# Patient Record
Sex: FEMALE | Race: WHITE | NOT HISPANIC OR LATINO | Employment: FULL TIME | URBAN - METROPOLITAN AREA
[De-identification: names, ages, dates, MRNs, and addresses within clinical notes are randomized per-mention and may not be internally consistent; named-entity substitution may affect disease eponyms.]

---

## 2017-01-06 ENCOUNTER — OFFICE VISIT (OUTPATIENT)
Dept: PSYCHIATRY | Facility: CLINIC | Age: 47
End: 2017-01-06
Payer: COMMERCIAL

## 2017-01-06 DIAGNOSIS — F43.23 ADJUSTMENT DISORDER WITH MIXED ANXIETY AND DEPRESSED MOOD: Primary | ICD-10-CM

## 2017-01-06 PROCEDURE — 90834 PSYTX W PT 45 MINUTES: CPT | Mod: S$GLB,,, | Performed by: PSYCHOLOGIST

## 2017-01-06 NOTE — PROGRESS NOTES
Individual Pyschotherapy (PhD/LCSW)    1/6/2017    Time:    Site:  [facility]           Therapeutic Intervention: Outpatient - Insight oriented psychotherapy 45-50 min - CPT code 88403    Chief complaint/reason for encounter:  Adjustment d/o     Interval history and content of current session:  Pt c/o of feeling overwhelmed again by multiple stressors including (children, ex-spouse, work, etc). We discussed the way she puts pressure on herself to take responsibility for solving everyone's problems and creates unreasonable expectations that the problems can be solved once-and-for-all. Worked with her to re-frame her goals in a more realistic paradigm that recognizes the limits of her power to control outcomes, focuses on coping with challenges in the present, and remains committed to doing the best she can do within her limits. Noted that she resonated to this idea. Noted how her assume responsibility for things she can't control relates back to her fx of origin experiences.      Treatment plan:  Target symptoms: Depressions, anxiety, irritability, self-esteem    Risk parameters:  Patient reports no suicidal ideation  Patient reports no homicidal ideation  Patient reports no self-injurious behavior  Patient reports no violent behavior    Verbal deficits: None    Patient's response to intervention:  The patient's response to intervention is accepting, motivated.    Progress toward goals and other mental status changes:  The patient's progress toward goals is fair    Diagnosis:309.28    Plan:  continue psychotherapy     Return to clinic: as scheduled.

## 2017-01-30 ENCOUNTER — OFFICE VISIT (OUTPATIENT)
Dept: PSYCHIATRY | Facility: CLINIC | Age: 47
End: 2017-01-30
Payer: COMMERCIAL

## 2017-01-30 DIAGNOSIS — F43.23 ADJUSTMENT DISORDER WITH MIXED ANXIETY AND DEPRESSED MOOD: Primary | ICD-10-CM

## 2017-01-30 PROCEDURE — 90834 PSYTX W PT 45 MINUTES: CPT | Mod: S$GLB,,, | Performed by: PSYCHOLOGIST

## 2017-01-30 NOTE — PROGRESS NOTES
"Individual Pyschotherapy (PhD/LCSW)    1/30/2017    Time:    Site:  [facility]           Therapeutic Intervention: Outpatient - Insight oriented psychotherapy 45-50 min - CPT code 36837    Chief complaint/reason for encounter:  Adjustment d/o     Interval history and content of current session:  Pt reports she is feeling a little better. She and her significant other, Raoul, have been enjoying quality time in spite of the stressful demands of childcare and work. However, work remains a source of considerable stress especially in connection with her co-worker who she experiences as competitive, manipulative, and sneaky. Pt's reaction to her co-worker's behavior has reached the point where it is affecting both her productivity at work and her ability to function effectively at home. We discussed the issues evoked by her co-worker and pt was able to relate it to her own perfectionism and the areas of work where the pt feels less adequate (mainly paperwork). Pt agreed she needs to shift her goals from being outstanding in all areas to simply "doing her best". She also was able to come up with the idea of redirecting thoughts about her co-worker towards aspects of her work life that she could address and improve on.        Treatment plan:  Target symptoms: Depressions, anxiety, irritability, self-esteem    Risk parameters:  Patient reports no suicidal ideation  Patient reports no homicidal ideation  Patient reports no self-injurious behavior  Patient reports no violent behavior    Verbal deficits: None    Patient's response to intervention:  The patient's response to intervention is accepting, motivated.    Progress toward goals and other mental status changes:  The patient's progress toward goals is fair    Diagnosis:309.28    Plan:  continue psychotherapy     Return to clinic: as scheduled.  "

## 2017-02-17 ENCOUNTER — OFFICE VISIT (OUTPATIENT)
Dept: PSYCHIATRY | Facility: CLINIC | Age: 47
End: 2017-02-17
Payer: COMMERCIAL

## 2017-02-17 DIAGNOSIS — F43.23 ADJUSTMENT DISORDER WITH MIXED ANXIETY AND DEPRESSED MOOD: Primary | ICD-10-CM

## 2017-02-17 PROCEDURE — 90834 PSYTX W PT 45 MINUTES: CPT | Mod: S$GLB,,, | Performed by: PSYCHOLOGIST

## 2017-02-17 NOTE — PROGRESS NOTES
"Individual Pyschotherapy (PhD/LCSW)    2/17/2017    Time:    Site:  [facility]           Therapeutic Intervention: Outpatient - Insight oriented psychotherapy 45-50 min - CPT code 79542    Chief complaint/reason for encounter:  Adjustment d/o     Interval history and content of current session:  Pt describes herself as frustrated, discouraged, and overwhelmed at present by the combined demands of work, finances, and family. At work she is facing the ongoing problem of being upstaged by her colleague who appears better at ingratiating herself with their supervisor than the pt, despite the pt's greater ability to do their job. At home she bears the total responsibility for their children's education (all at private schools at present) and for managing the behavior of the two younger ones. We discussed the way she takes on responsibility for everyone else's problems while ignoring her limits. She agreed that her "sanity" was more important than which school her children end up going to. She also agreed that she needs to talk to her former supervisor to get advice as how to deal with her current work problems. And, she has come to the conclusion that she probably needs to start a new job search.          Treatment plan:  Target symptoms: Depressions, anxiety, irritability, self-esteem    Risk parameters:  Patient reports no suicidal ideation  Patient reports no homicidal ideation  Patient reports no self-injurious behavior  Patient reports no violent behavior    Verbal deficits: None    Patient's response to intervention:  The patient's response to intervention is accepting, motivated.    Progress toward goals and other mental status changes:  The patient's progress toward goals is fair    Diagnosis:309.28    Plan:  continue psychotherapy     Return to clinic: as scheduled.  "

## 2017-04-04 ENCOUNTER — OFFICE VISIT (OUTPATIENT)
Dept: PSYCHIATRY | Facility: CLINIC | Age: 47
End: 2017-04-04
Payer: COMMERCIAL

## 2017-04-04 DIAGNOSIS — F43.23 ADJUSTMENT DISORDER WITH MIXED ANXIETY AND DEPRESSED MOOD: Primary | ICD-10-CM

## 2017-04-04 PROCEDURE — 99999 PR PBB SHADOW E&M-EST. PATIENT-LVL I: CPT | Mod: PBBFAC,,, | Performed by: PSYCHOLOGIST

## 2017-04-04 PROCEDURE — 90834 PSYTX W PT 45 MINUTES: CPT | Mod: S$GLB,,, | Performed by: PSYCHOLOGIST

## 2017-04-04 NOTE — PROGRESS NOTES
"Individual Pyschotherapy (PhD/LCSW)    4/4/2017    Time:    Site:  [facility]           Therapeutic Intervention: Outpatient - Insight oriented psychotherapy 45-50 min - CPT code 91655    Chief complaint/reason for encounter:  Adjustment d/o     Interval history and content of current session:  Pt reported she has made significant progress in dealing with the stress of her work situation. She was able to connect with her manager about the problems with her co-worker and is now able to focus on her work without having to be constantly worried about being undermined by the co-worker. She is actively engaged in transferring what she learned from the experience to other areas of stress in her life by applying principles of "goal identification", research, and self-acceptance into her challenges.          Treatment plan:  Target symptoms: Depressions, anxiety, irritability, self-esteem    Risk parameters:  Patient reports no suicidal ideation  Patient reports no homicidal ideation  Patient reports no self-injurious behavior  Patient reports no violent behavior    Verbal deficits: None    Patient's response to intervention:  The patient's response to intervention is accepting, motivated.    Progress toward goals and other mental status changes:  The patient's progress toward goals is fair    Diagnosis:309.28    Plan:  continue psychotherapy     Return to clinic: as scheduled.  "

## 2017-06-22 ENCOUNTER — OFFICE VISIT (OUTPATIENT)
Dept: PSYCHIATRY | Facility: CLINIC | Age: 47
End: 2017-06-22
Payer: COMMERCIAL

## 2017-06-22 DIAGNOSIS — F43.23 ADJUSTMENT DISORDER WITH MIXED ANXIETY AND DEPRESSED MOOD: Primary | ICD-10-CM

## 2017-06-22 PROCEDURE — 99999 PR PBB SHADOW E&M-EST. PATIENT-LVL I: CPT | Mod: PBBFAC,,, | Performed by: PSYCHOLOGIST

## 2017-06-22 PROCEDURE — 90834 PSYTX W PT 45 MINUTES: CPT | Mod: S$GLB,,, | Performed by: PSYCHOLOGIST

## 2017-06-22 NOTE — PROGRESS NOTES
Individual Pyschotherapy (PhD/LCSW)    6/22/2017    Time:    Site:  [facility]           Therapeutic Intervention: Outpatient - Insight oriented psychotherapy 45-50 min - CPT code 64783    Chief complaint/reason for encounter:  Adjustment d/o     Interval history and content of current session:  Pt reports feeling overwhelmed by multiple demands of children, work, finances, and coping with her ex-spouse. She is particularly distressed by the ex's demands for help with his issues. Noted that she has stood her ground and responded assertively to him despite guilt for doing so. We discussed the need for pt to take care of herself and to maintain boundaries with the ex and the ex's mother. Also discussed how she might re-frame her goals in a way that is more conducive to taking one day at at time rather than making more global assessments of how she is doing. Pt notes that her relationship with her finance is going well despite the stress.     Treatment plan:  Target symptoms: Depressions, anxiety, irritability, self-esteem    Risk parameters:  Patient reports no suicidal ideation  Patient reports no homicidal ideation  Patient reports no self-injurious behavior  Patient reports no violent behavior    Verbal deficits: None    Patient's response to intervention:  The patient's response to intervention is accepting, motivated.    Progress toward goals and other mental status changes:  The patient's progress toward goals is fair    Diagnosis:309.28    Plan:  continue psychotherapy     Return to clinic: as scheduled.

## 2017-07-21 ENCOUNTER — OFFICE VISIT (OUTPATIENT)
Dept: PSYCHIATRY | Facility: CLINIC | Age: 47
End: 2017-07-21
Payer: COMMERCIAL

## 2017-07-21 DIAGNOSIS — F43.23 ADJUSTMENT DISORDER WITH MIXED ANXIETY AND DEPRESSED MOOD: Primary | ICD-10-CM

## 2017-07-21 PROCEDURE — 90834 PSYTX W PT 45 MINUTES: CPT | Mod: S$GLB,,, | Performed by: PSYCHOLOGIST

## 2017-07-21 NOTE — PROGRESS NOTES
Individual Pyschotherapy (PhD/LCSW)    7/21/2017    Time:    Site:  [facility]           Therapeutic Intervention: Outpatient - Insight oriented psychotherapy 45-50 min - CPT code 59490    Chief complaint/reason for encounter:  Adjustment d/o     Interval history and content of current session:  Pt reports in regaining a sense of control in her life. She has worked through issues with her co-worker to the point where they are functioning as a team and doing well with their productivity numbers. She has continued to be assertive with her ex-spouse and is working at depersonalizing his comments with some success. Since he has had the children for the last month due to their summer vacation, she has gotten a break from the multiple stressors of childcare, work, and related concerns. However, she is worried about how she will cope when they start school and move back with her. We discussed the importance of being aware and accepting of her limits, self-care, and trusting herself to cope. We also looked at ways she can add daily structure to her life to help her with her ADD symptoms. Pt is interested in learning some mediation techniques and we plan to address this in coming sessions.     Treatment plan:  Target symptoms: Depressions, anxiety, irritability, self-esteem    Risk parameters:  Patient reports no suicidal ideation  Patient reports no homicidal ideation  Patient reports no self-injurious behavior  Patient reports no violent behavior    Verbal deficits: None    Patient's response to intervention:  The patient's response to intervention is accepting, motivated.    Progress toward goals and other mental status changes:  The patient's progress toward goals is fair    Diagnosis:309.28    Plan:  continue psychotherapy     Return to clinic: as scheduled.

## 2017-08-17 ENCOUNTER — OFFICE VISIT (OUTPATIENT)
Dept: PSYCHIATRY | Facility: CLINIC | Age: 47
End: 2017-08-17
Payer: COMMERCIAL

## 2017-08-17 DIAGNOSIS — F43.23 ADJUSTMENT DISORDER WITH MIXED ANXIETY AND DEPRESSED MOOD: Primary | ICD-10-CM

## 2017-08-17 PROCEDURE — 90834 PSYTX W PT 45 MINUTES: CPT | Mod: S$GLB,,, | Performed by: PSYCHOLOGIST

## 2017-08-17 NOTE — PROGRESS NOTES
Individual Pyschotherapy (PhD/LCSW)    8/17/2017    Time:    Site:  [facility]           Therapeutic Intervention: Outpatient - Insight oriented psychotherapy 45-50 min - CPT code 70157    Chief complaint/reason for encounter:  Adjustment d/o     Interval history and content of current session:  Pt discussed the difficulty of managing all the stressors in her life (children, work, finances, etc) while feeling ultimately alone. She appreciates the support of her boyfriend, but she feels that his commitment to her well -being and the well-being of her children is limited. So, when it comes to making decisions, it all falls on her shoulders. Her work situation has improved. But, her finances are stretched thin and she is coping with significant difficulties re: each of her children. She has a hard time giving her self credit for what she has accomplished. We discussed her fx of origin dynamics as they relate to the expectations she has or herself can be unrealistically critical.     Treatment plan:  Target symptoms: Depressions, anxiety, irritability, self-esteem    Risk parameters:  Patient reports no suicidal ideation  Patient reports no homicidal ideation  Patient reports no self-injurious behavior  Patient reports no violent behavior    Verbal deficits: None    Patient's response to intervention:  The patient's response to intervention is accepting, motivated.    Progress toward goals and other mental status changes:  The patient's progress toward goals is fair    Diagnosis:309.28    Plan:  continue psychotherapy     Return to clinic: as scheduled.

## 2017-10-19 ENCOUNTER — OFFICE VISIT (OUTPATIENT)
Dept: PSYCHIATRY | Facility: CLINIC | Age: 47
End: 2017-10-19
Payer: COMMERCIAL

## 2017-10-19 DIAGNOSIS — F43.23 ADJUSTMENT DISORDER WITH MIXED ANXIETY AND DEPRESSED MOOD: Primary | ICD-10-CM

## 2017-10-19 PROCEDURE — 90834 PSYTX W PT 45 MINUTES: CPT | Mod: S$GLB,,, | Performed by: PSYCHOLOGIST

## 2017-10-19 NOTE — PROGRESS NOTES
Individual Pyschotherapy (PhD/LCSW)    10/19/2017    Time:    Site:  [facility]           Therapeutic Intervention: Outpatient - Insight oriented psychotherapy 45-50 min - CPT code 77368    Chief complaint/reason for encounter:  Adjustment d/o     Interval history and content of current session:  Pt reports that things are continuing to go well at work (she recently got a new position) and she is handling her frustration with her ex-spouse better. Her concern to day is focused on her 11 y.omiddle child, , and the feelings his passive aggressive behavior evokes in her. Pt is very worried about 's poor academic performance and very frustrated by her inability manage his behavior (intrusive; unresponsive to limits; manipulative). She also finds herself feeling increasingly estranged from him (ie not wanting to be around him) which, in turn, makes her feel guilty. Using the concept of projective identification, we explored various options in how to respond differently to . Pt was able to embrace the idea that the powerlessness and anger she feels may indicate what he is experiencing but can't handle; so he is acting (albeit unconsciously) to get her to feel the feelings he can't deal with. This hypothesis allowed pt to consider different responses based on her insight into her own feelings of powerlessness and frustration in her encounters with him. Pt found the suggestions helpful.     Treatment plan:  Target symptoms: Depressions, anxiety, irritability, self-esteem    Risk parameters:  Patient reports no suicidal ideation  Patient reports no homicidal ideation  Patient reports no self-injurious behavior  Patient reports no violent behavior    Verbal deficits: None    Patient's response to intervention:  The patient's response to intervention is accepting, motivated.    Progress toward goals and other mental status changes:  The patient's progress toward goals is  fair    Diagnosis:309.28    Plan:  continue psychotherapy     Return to clinic: as scheduled.

## 2018-01-10 ENCOUNTER — OFFICE VISIT (OUTPATIENT)
Dept: PSYCHIATRY | Facility: CLINIC | Age: 48
End: 2018-01-10
Payer: COMMERCIAL

## 2018-01-10 DIAGNOSIS — F43.23 ADJUSTMENT DISORDER WITH MIXED ANXIETY AND DEPRESSED MOOD: Primary | ICD-10-CM

## 2018-01-10 PROCEDURE — 90834 PSYTX W PT 45 MINUTES: CPT | Mod: S$GLB,,, | Performed by: PSYCHOLOGIST

## 2018-01-10 NOTE — PROGRESS NOTES
"Individual Pyschotherapy (PhD/LCSW)    1/10/2018    Time:    Site:  [facility]           Therapeutic Intervention: Outpatient - Insight oriented psychotherapy 45-50 min - CPT code 28757    Chief complaint/reason for encounter:  Adjustment d/o     Interval history and content of current session:  Pt focused on her problems managing the behavior of her middle son, , who is doing poorly in school and with whom she gets into daily battles around homework. Pt frustrated with herself for losing her temper and triggering escalating power struggles with him. She feels it is affecting her work and degrading the atmosphere in her home (so that her younger son is affected). She is "at her wits end" regarding how to motivate him. She has recently had him tested and the results confirm ADHD. She is looking for a therapist for him. We discussed parenting techniques that focus on reward (vs punishment). Also worked with her to develop a "daily ritual of awareness" to help her manage her feelings when she comes up against his oppositional behavior.     Treatment plan:  Target symptoms: Depressions, anxiety, irritability, self-esteem    Risk parameters:  Patient reports no suicidal ideation  Patient reports no homicidal ideation  Patient reports no self-injurious behavior  Patient reports no violent behavior    Verbal deficits: None    Patient's response to intervention:  The patient's response to intervention is accepting, motivated.    Progress toward goals and other mental status changes:  The patient's progress toward goals is fair    Diagnosis:309.28    Plan:  continue psychotherapy     Return to clinic: as scheduled.  "

## 2018-01-25 ENCOUNTER — OFFICE VISIT (OUTPATIENT)
Dept: URGENT CARE | Facility: CLINIC | Age: 48
End: 2018-01-25
Payer: COMMERCIAL

## 2018-01-25 VITALS
SYSTOLIC BLOOD PRESSURE: 156 MMHG | RESPIRATION RATE: 18 BRPM | BODY MASS INDEX: 26.5 KG/M2 | HEART RATE: 99 BPM | TEMPERATURE: 100 F | OXYGEN SATURATION: 100 % | DIASTOLIC BLOOD PRESSURE: 84 MMHG | HEIGHT: 60 IN | WEIGHT: 135 LBS

## 2018-01-25 DIAGNOSIS — J10.1 INFLUENZA A: Primary | ICD-10-CM

## 2018-01-25 LAB
CTP QC/QA: YES
FLUAV AG NPH QL: POSITIVE
FLUBV AG NPH QL: NEGATIVE

## 2018-01-25 PROCEDURE — 87804 INFLUENZA ASSAY W/OPTIC: CPT | Mod: QW,S$GLB,, | Performed by: NURSE PRACTITIONER

## 2018-01-25 PROCEDURE — 99213 OFFICE O/P EST LOW 20 MIN: CPT | Mod: S$GLB,,, | Performed by: NURSE PRACTITIONER

## 2018-01-25 RX ORDER — BENZONATATE 100 MG/1
200 CAPSULE ORAL 3 TIMES DAILY PRN
Qty: 30 CAPSULE | Refills: 0 | Status: SHIPPED | OUTPATIENT
Start: 2018-01-25 | End: 2018-02-04

## 2018-01-25 RX ORDER — LISDEXAMFETAMINE DIMESYLATE 30 MG/1
30 CAPSULE ORAL EVERY MORNING
COMMUNITY

## 2018-01-25 RX ORDER — OSELTAMIVIR PHOSPHATE 75 MG/1
75 CAPSULE ORAL 2 TIMES DAILY
Qty: 10 CAPSULE | Refills: 0 | Status: SHIPPED | OUTPATIENT
Start: 2018-01-25 | End: 2018-01-30

## 2018-01-25 NOTE — PATIENT INSTRUCTIONS
Please follow up with your primary care provider if you are not feeling better in 7-10 days.    Please drink plenty of fluids.  Please get plenty of rest.    Please return here or go to the Emergency Department for any concerns or worsening of condition.    If you were prescribed antibiotics, please take them to completion.    If you do have Hypertension or palpitations, it is safe to take Coricidin HBP or Mucinex DM for relief of congestion and cough. Take as directed on bottle with at least 2 glasses of water.    If not allergic, please take over the counter Tylenol (Acetaminophen) and/or Motrin (Ibuprofen) as directed on bottle for control of pain and/or fever.    Please follow up with your primary care doctor or specialist as needed.    If you  smoke, please stop smoking.    Influenza (Adult)    Influenza is also called the flu. It is a viral illness that affects the air passages of your lungs. It is different from the common cold. The flu can easily be passed from one to person to another. It may be spread through the air by coughing and sneezing. Or it can be spread by touching the sick person and then touching your own eyes, nose, or mouth.  The flu starts 1 to 3 days after you are exposed to the flu virus. It may last for 1 to 2 weeks but many people feel tired or fatigued for many weeks afterward. You usually dont need to take antibiotics unless you have a complication. This might be an ear or sinus infection or pneumonia.  Symptoms of the flu may be mild or severe. They can include extreme tiredness (wanting to stay in bed all day), chills, fevers, muscle aches, soreness with eye movement, headache, and a dry, hacking cough.  Home care  Follow these guidelines when caring for yourself at home:  · Avoid being around cigarette smoke, whether yours or other peoples.  · Acetaminophen or ibuprofen will help ease your fever, muscle aches, and headache. Dont give aspirin to anyone younger than 18 who has the  flu. Aspirin can harm the liver.  · Nausea and loss of appetite are common with the flu. Eat light meals. Drink 6 to 8 glasses of liquids every day. Good choices are water, sport drinks, soft drinks without caffeine, juices, tea, and soup. Extra fluids will also help loosen secretions in your nose and lungs.  · Over-the-counter cold medicines will not make the flu go away faster. But the medicines may help with coughing, sore throat, and congestion in your nose and sinuses. Dont use a decongestant if you have high blood pressure.  · Stay home until your fever has been gone for at least 24 hours without using medicine to reduce fever.  Follow-up care  Follow up with your healthcare provider, or as advised, if you are not getting better over the next week.  If you are age 65 or older, talk with your provider about getting a pneumococcal vaccine every 5 years. You should also get this vaccine if you have chronic asthma or COPD. All adults should get a flu vaccine every fall. Ask your provider about this.  When to seek medical advice  Call your healthcare provider right away if any of these occur:  · Cough with lots of colored mucus (sputum) or blood in your mucus  · Chest pain, shortness of breath, wheezing, or trouble breathing  · Severe headache, or face, neck, or ear pain  · New rash with fever  · Fever of 100.4°F (38°C) or higher, or as directed by your healthcare provider  · Confusion, behavior change, or seizure  · Severe weakness or dizziness  · You get a new fever or cough after getting better for a few days  Date Last Reviewed: 1/1/2017  © 7208-5840 [employer]. [address] [place] [address], [place] [address]. All rights reserved. This information is not intended as a substitute for professional medical care. Always follow your healthcare professional's instructions.

## 2018-01-25 NOTE — PROGRESS NOTES
Subjective:       Patient ID: Rebeca Birch is a 47 y.o. female.    Vitals:  height is 5' (1.524 m) and weight is 61.2 kg (135 lb). Her temperature is 99.5 °F (37.5 °C). Her blood pressure is 156/84 (abnormal) and her pulse is 99. Her respiration is 18 and oxygen saturation is 100%.     Chief Complaint: Sore Throat (Started 2 days ago )    Pt reports symptoms for 2 days.      Sore Throat    This is a new problem. The current episode started in the past 7 days. The problem has been unchanged. The pain is worse on the left side. The pain is mild. Associated symptoms include congestion, coughing, headaches and a hoarse voice. Pertinent negatives include no abdominal pain, ear pain or shortness of breath. The treatment provided no relief.     Review of Systems   Constitution: Positive for malaise/fatigue. Negative for chills and fever.   HENT: Positive for congestion, hoarse voice and sore throat. Negative for ear pain.    Eyes: Negative for discharge and redness.   Cardiovascular: Negative for chest pain, dyspnea on exertion and leg swelling.   Respiratory: Positive for cough. Negative for shortness of breath, sputum production and wheezing.    Musculoskeletal: Positive for myalgias.   Gastrointestinal: Negative for abdominal pain and nausea.   Neurological: Positive for headaches.       Objective:      Physical Exam   Constitutional: She is oriented to person, place, and time. Vital signs are normal. She appears well-developed and well-nourished. She is cooperative.  Non-toxic appearance. She does not have a sickly appearance. She does not appear ill. No distress.   HENT:   Head: Normocephalic and atraumatic.   Right Ear: Hearing, tympanic membrane, external ear and ear canal normal.   Left Ear: Hearing, tympanic membrane, external ear and ear canal normal.   Nose: Mucosal edema and rhinorrhea present.   Mouth/Throat: Uvula is midline and mucous membranes are normal. Posterior oropharyngeal edema and posterior  oropharyngeal erythema present.   Eyes: Conjunctivae and lids are normal.   Neck: Normal range of motion and full passive range of motion without pain. Neck supple. No neck rigidity. No edema, no erythema and normal range of motion present.   Cardiovascular: Normal rate, regular rhythm and normal heart sounds.    Pulmonary/Chest: Effort normal and breath sounds normal. No accessory muscle usage. No apnea, no tachypnea and no bradypnea. No respiratory distress. She has no decreased breath sounds. She has no wheezes. She has no rhonchi. She has no rales.   Positive cough   Abdominal: Normal appearance.   Lymphadenopathy:        Head (right side): No submental, no submandibular, no tonsillar, no preauricular, no posterior auricular and no occipital adenopathy present.        Head (left side): No submental, no submandibular, no tonsillar, no preauricular, no posterior auricular and no occipital adenopathy present.     She has cervical adenopathy.        Right cervical: Superficial cervical adenopathy present.        Left cervical: Superficial cervical adenopathy present.   Neurological: She is alert and oriented to person, place, and time.   Psychiatric: She has a normal mood and affect. Her speech is normal and behavior is normal.   Nursing note and vitals reviewed.      Assessment:       1. Influenza A        Plan:         Influenza A  -     POCT Influenza A/B  -     oseltamivir (TAMIFLU) 75 MG capsule; Take 1 capsule (75 mg total) by mouth 2 (two) times daily.  Dispense: 10 capsule; Refill: 0  -     benzonatate (TESSALON PERLES) 100 MG capsule; Take 2 capsules (200 mg total) by mouth 3 (three) times daily as needed.  Dispense: 30 capsule; Refill: 0      Discussed positive results of flu test with pt and symptom therapy for fevers and congestion with tylenol, ibuprofen, and mucinex as directed on bottle. Increase fluids.  Follow up with pcp for no improvement in 7-10 days.

## 2018-02-02 ENCOUNTER — OFFICE VISIT (OUTPATIENT)
Dept: PSYCHIATRY | Facility: CLINIC | Age: 48
End: 2018-02-02
Payer: COMMERCIAL

## 2018-02-02 DIAGNOSIS — F43.23 ADJUSTMENT DISORDER WITH MIXED ANXIETY AND DEPRESSED MOOD: Primary | ICD-10-CM

## 2018-02-02 PROCEDURE — 90834 PSYTX W PT 45 MINUTES: CPT | Mod: S$GLB,,, | Performed by: PSYCHOLOGIST

## 2018-02-02 NOTE — PROGRESS NOTES
"Individual Pyschotherapy (PhD/LCSW)    2/2/2018    Time:    Site:  [facility]           Therapeutic Intervention: Outpatient - Insight oriented psychotherapy 45-50 min - CPT code 48382    Chief complaint/reason for encounter:  Adjustment d/o     Interval history and content of current session:  Pt discussed feeling overwhelmed with multiple demands of work, children, finances, etc. She fears that her job situation is changing and that she may not be able to keep it through the end of the year. She is struggling to meet the multiple needs of her 3 children who she hopes to keep in private schools. She feels proud of being able to take on the responsibilities she is managing, but she also fears losing her ability to succeed in any of these endeavors. We discussed strategies she can use to reduce her anxiety to a more manageable level. For example, she can ask her signficant other for help readying he resume so that she is prepared to look for gettng a new job if necessary. She can refuse to get involved with her ex-mother-in-laws difficulties with her middle child. Also looked at her worst fears and noted that she has shown the resilience in the past to manage them should they come to fruition. Encouraged her to think about "plowing ahead" with her work doing the best she can each day rather than dwelling on the possibilities of what might happen in the future. In this regard she has started doing a daily ritual which she says is helping manage her anxiety by reciting the Serenity Prayer a the beginning of each day.     Treatment plan:  Target symptoms: Depressions, anxiety, irritability, self-esteem    Risk parameters:  Patient reports no suicidal ideation  Patient reports no homicidal ideation  Patient reports no self-injurious behavior  Patient reports no violent behavior    Verbal deficits: None    Patient's response to intervention:  The patient's response to intervention is accepting, " motivated.    Progress toward goals and other mental status changes:  The patient's progress toward goals is fair    Diagnosis:309.28    Plan:  continue psychotherapy     Return to clinic: as scheduled.

## 2018-07-16 ENCOUNTER — OFFICE VISIT (OUTPATIENT)
Dept: PSYCHIATRY | Facility: CLINIC | Age: 48
End: 2018-07-16
Payer: COMMERCIAL

## 2018-07-16 DIAGNOSIS — F43.23 ADJUSTMENT DISORDER WITH MIXED ANXIETY AND DEPRESSED MOOD: Primary | ICD-10-CM

## 2018-07-16 PROCEDURE — 90834 PSYTX W PT 45 MINUTES: CPT | Mod: S$GLB,,, | Performed by: PSYCHOLOGIST

## 2018-09-05 ENCOUNTER — OFFICE VISIT (OUTPATIENT)
Dept: PSYCHIATRY | Facility: CLINIC | Age: 48
End: 2018-09-05
Payer: COMMERCIAL

## 2018-09-05 DIAGNOSIS — F43.23 ADJUSTMENT DISORDER WITH MIXED ANXIETY AND DEPRESSED MOOD: Primary | ICD-10-CM

## 2018-09-05 PROCEDURE — 90834 PSYTX W PT 45 MINUTES: CPT | Mod: S$GLB,,, | Performed by: PSYCHOLOGIST

## 2018-09-05 NOTE — PROGRESS NOTES
"Individual Pyschotherapy (PhD/LCSW)    9/5/2018    Time:    Site:  [facility]           Therapeutic Intervention: Outpatient - Insight oriented psychotherapy 45-50 min - CPT code 56879    Chief complaint/reason for encounter:  Adjustment d/o     Interval history and content of current session:  Pt reports having a good summer both at work and with her family. However, with the children back in school and uncertainties about the future of her job (pending a layoff scheduled for Oct) pt describes herself again feeling overwhelmed with multiple demands of work, children, finances, etc.  In the process of attending to the multiple things competing for her attention, she feels like she is not doing a good enough job with any of them and she is ignoring important elements of self-care (eg exercise). She found it very helpful to consider that she is so motivated to accomplish tasks very well undermines her motivation to do anything at all (because she can't do it the way she feels she should do it). She agreed to start focusing on doing small things to take of herself and her needs rather than trying to do them all at once. She could also apply this strategy to work situations - eg. focus on starting her resume instead of sitting down and doing the whole thing in case she gets laid off). We also discussed how much she is actually accomplishing with her children and her work, despite the fact it may be falling short of "the ideal."     Treatment plan:  Target symptoms: Depressions, anxiety, irritability, self-esteem    Risk parameters:  Patient reports no suicidal ideation  Patient reports no homicidal ideation  Patient reports no self-injurious behavior  Patient reports no violent behavior    Verbal deficits: None    Patient's response to intervention:  The patient's response to intervention is accepting, motivated.    Progress toward goals and other mental status changes:  The patient's progress toward goals is " fair    Diagnosis:309.28    Plan:  continue psychotherapy     Return to clinic: as scheduled.

## 2018-09-17 ENCOUNTER — OFFICE VISIT (OUTPATIENT)
Dept: URGENT CARE | Facility: CLINIC | Age: 48
End: 2018-09-17
Payer: COMMERCIAL

## 2018-09-17 VITALS
WEIGHT: 135 LBS | BODY MASS INDEX: 27.21 KG/M2 | HEART RATE: 80 BPM | RESPIRATION RATE: 18 BRPM | TEMPERATURE: 98 F | OXYGEN SATURATION: 100 % | HEIGHT: 59 IN | DIASTOLIC BLOOD PRESSURE: 83 MMHG | SYSTOLIC BLOOD PRESSURE: 146 MMHG

## 2018-09-17 DIAGNOSIS — J02.9 SORE THROAT: Primary | ICD-10-CM

## 2018-09-17 LAB
CTP QC/QA: YES
S PYO RRNA THROAT QL PROBE: NEGATIVE

## 2018-09-17 PROCEDURE — 99214 OFFICE O/P EST MOD 30 MIN: CPT | Mod: S$GLB,,, | Performed by: NURSE PRACTITIONER

## 2018-09-17 PROCEDURE — 87880 STREP A ASSAY W/OPTIC: CPT | Mod: QW,S$GLB,, | Performed by: NURSE PRACTITIONER

## 2018-09-17 RX ORDER — DEXTROAMPHETAMINE SULFATE, DEXTROAMPHETAMINE SACCHARATE, AMPHETAMINE ASPARTATE MONOHYDRATE, AND AMPHETAMINE SULFATE 6.25; 6.25; 6.25; 6.25 MG/1; MG/1; MG/1; MG/1
CAPSULE, EXTENDED RELEASE ORAL
Refills: 0 | COMMUNITY
Start: 2018-07-29 | End: 2023-10-31

## 2018-09-17 NOTE — PROGRESS NOTES
"Subjective:       Patient ID: Rebeca Birch is a 47 y.o. female.    Vitals:  height is 4' 11" (1.499 m) and weight is 61.2 kg (135 lb). Her oral temperature is 98.4 °F (36.9 °C). Her blood pressure is 146/83 (abnormal) and her pulse is 80. Her respiration is 18 and oxygen saturation is 100%.     Chief Complaint: Sore Throat      The patient presents to the clinic today with complaints of sore throat.  Her son was seen and diagnosed with strep throat here yesterday.  Patient recently had a dental abscess for the bone graft placement in her back left crown.  She is followed closely by dentistry.  Finished amoxicillin 1 week ago.  She is also complaining of some lymph node swelling in her neck.  Denies any fever, chills, or body aches.  She has a follow up with dentistry next week.      Sore Throat    This is a new problem. The current episode started yesterday. The problem has been gradually worsening. Neither side of throat is experiencing more pain than the other. The maximum temperature recorded prior to her arrival was 100.4 - 100.9 F. The fever has been present for less than 1 day. The pain is at a severity of 5/10. The pain is moderate. Associated symptoms include congestion, a hoarse voice and swollen glands. Pertinent negatives include no abdominal pain, coughing, ear pain, headaches or shortness of breath. She has had exposure to strep. She has had no exposure to mono. She has tried nothing for the symptoms. The treatment provided no relief.     Review of Systems   Constitution: Positive for fever. Negative for chills and malaise/fatigue.   HENT: Positive for congestion, hoarse voice and sore throat. Negative for ear pain.    Eyes: Negative for discharge and redness.   Cardiovascular: Negative for chest pain, dyspnea on exertion and leg swelling.   Respiratory: Negative for cough, shortness of breath, sputum production and wheezing.    Musculoskeletal: Negative for myalgias.   Gastrointestinal: Negative " for abdominal pain and nausea.   Neurological: Negative for headaches.       Objective:      Physical Exam   Constitutional: She is oriented to person, place, and time. She appears well-developed and well-nourished. She is cooperative.  Non-toxic appearance. She does not appear ill. No distress.   HENT:   Head: Normocephalic and atraumatic.   Right Ear: Hearing, tympanic membrane, external ear and ear canal normal.   Left Ear: Hearing, tympanic membrane, external ear and ear canal normal.   Nose: Nose normal. No mucosal edema, rhinorrhea or nasal deformity. No epistaxis. Right sinus exhibits no maxillary sinus tenderness and no frontal sinus tenderness. Left sinus exhibits no maxillary sinus tenderness and no frontal sinus tenderness.   Mouth/Throat: Uvula is midline and mucous membranes are normal. No trismus in the jaw. Normal dentition. No uvula swelling. Posterior oropharyngeal erythema present. Tonsils are 1+ on the right. Tonsils are 1+ on the left. No tonsillar exudate.       Eyes: Conjunctivae and lids are normal. No scleral icterus.   Sclera clear bilat   Neck: Trachea normal, full passive range of motion without pain and phonation normal. Neck supple.   Cardiovascular: Normal rate, regular rhythm, normal heart sounds, intact distal pulses and normal pulses.   Pulmonary/Chest: Effort normal and breath sounds normal. No respiratory distress.   Abdominal: Soft. Normal appearance and bowel sounds are normal. She exhibits no distension. There is no tenderness.   Musculoskeletal: Normal range of motion. She exhibits no edema or deformity.   Lymphadenopathy:        Head (right side): Submental and submandibular adenopathy present.        Head (left side): Submental and submandibular adenopathy present.   Neurological: She is alert and oriented to person, place, and time. She exhibits normal muscle tone. Coordination normal.   Skin: Skin is warm, dry and intact. She is not diaphoretic. No pallor.   Psychiatric:  She has a normal mood and affect. Her speech is normal and behavior is normal. Judgment and thought content normal. Cognition and memory are normal.   Nursing note and vitals reviewed.        Results for orders placed or performed in visit on 09/17/18   POCT rapid strep A   Result Value Ref Range    Rapid Strep A Screen Negative Negative     Acceptable Yes        Assessment:       1. Sore throat        Plan:         Sore throat  -     POCT rapid strep A      Patient Instructions       Self-Care for Sore Throats    Sore throats happen for many reasons, such as colds, allergies, and infections caused by viruses or bacteria. In any case, your throat becomes red and sore. Your goal for self-care is to reduce your discomfort while giving your throat a chance to heal.  Moisten and soothe your throat  Tips include the following:  · Try a sip of water first thing after waking up.  · Keep your throat moist by drinking 6 or more glasses of clear liquids every day.  · Run a cool-air humidifier in your room overnight.  · Avoid cigarette smoke.   · Suck on throat lozenges, cough drops, hard candy, ice chips, or frozen fruit-juice bars. Use the sugar-free versions if your diet or medical condition requires them.  Gargle to ease irritation  Gargling every hour or 2 can ease irritation. Try gargling with 1 of these solutions:  · 1/4 teaspoon of salt in 1/2 cup of warm water  · An over-the-counter anesthetic gargle  Use medicine for more relief  Over-the-counter medicine can reduce sore throat symptoms. Ask your pharmacist if you have questions about which medicine to use:  · Ease pain with anesthetic sprays. Aspirin or an aspirin substitute also helps. Remember, never give aspirin to anyone 18 or younger, or if you are already taking blood thinners.   · For sore throats caused by allergies, try antihistamines to block the allergic reaction.  · Remember: unless a sore throat is caused by a bacterial infection,  antibiotics wont help you.  Prevent future sore throats  Prevention tips include the following:  · Stop smoking or reduce contact with secondhand smoke. Smoke irritates the tender throat lining.  · Limit contact with pets and with allergy-causing substances, such as pollen and mold.  · When youre around someone with a sore throat or cold, wash your hands often to keep viruses or bacteria from spreading.  · Dont strain your vocal cords.  Call your healthcare provider  Contact your healthcare provider if you have:  · A temperature over 101°F (38.3°C)  · White spots on the throat  · Great difficulty swallowing  · Trouble breathing  · A skin rash  · Recent exposure to someone else with strep bacteria  · Severe hoarseness and swollen glands in the neck or jaw   Date Last Reviewed: 8/1/2016  © 9642-7903 [employer]. [address] [place] [address], [place] [address]. All rights reserved. This information is not intended as a substitute for professional medical care. Always follow your healthcare professional's instructions.        When You Have a Sore Throat    A sore throat can be painful. There are many reasons why you may have a sore throat. Your healthcare provider will work with you to find the cause of your sore throat. He or she will also find the best treatment for you.  What causes a sore throat?  Sore throats can be caused or worsened by:  · Cold or flu viruses  · Bacteria  · Irritants such as tobacco smoke or air pollution  · Acid reflux  A healthy throat  The tonsils are on the sides of the throat near the base of the tongue. They collect viruses and bacteria and help fight infection. The throat (pharynx) is the passage for air. Mucus from the nasal cavity also moves down the passage.  An inflamed throat  The tonsils and pharynx can become inflamed due to a cold or flu virus. Postnasal drip (excess mucus draining from the nasal cavity) can irritate the throat. It can also make the throat or tonsils  more likely to be infected by bacteria. Severe, untreated tonsillitis in children or adults can cause a pocket of pus (abscess) to form near the tonsil.  Your evaluation  A medical evaluation can help find the cause of your sore throat. It can also help your healthcare provider choose the best treatment for you. The evaluation may include a health history, physical exam, and diagnostic tests.  Health history  Your healthcare provider may ask you the following:  · How long has the sore throat lasted and how have you been treating it?  · Do you have any other symptoms, such as body aches, fever, or cough?  · Does your sore throat recur? If so, how often? How many days of school or work have you missed because of a sore throat?  · Do you have trouble eating or swallowing?  · Have you been told that you snore or have other sleep problems?  · Do you have bad breath?  · Do you cough up bad-tasting mucus?  Physical exam  During the exam, your healthcare provider checks your ears, nose, and throat for problems. He or she also checks for swelling in the neck, and may listen to your chest.  Possible tests  Other tests your healthcare provider may perform include:  · A throat swab to check for bacteria such as streptococcus (the bacteria that causes strep throat)  · A blood test to check for mononucleosis (a viral infection)  · A chest X-ray to rule out pneumonia, especially if you have a cough  Treating a sore throat  Treatment depends on many factors. What is the likely cause? Is the problem recent? Does it keep coming back? In many cases, the best thing to do is to treat the symptoms, rest, and let the problem heal itself. Antibiotics may help clear up some bacterial infections. For cases of severe or recurring tonsillitis, the tonsils may need to be removed.  Relieving your symptoms  · Dont smoke, and avoid secondhand smoke.  · For children, try throat sprays or Popsicles. Adults and older children may try  "lozenges.  · Drink warm liquids to soothe the throat and help thin mucus. Avoid alcohol, spicy foods, and acidic drinks such as orange juice. These can irritate the throat.  · Gargle with warm saltwater (1 teaspoon of salt to 8 ounces of warm water).  · Use a humidifier to keep air moist and relieve throat dryness.  · Try over-the-counter pain relievers such as acetaminophen or ibuprofen. Use as directed, and dont exceed the recommended dose. Dont give aspirin to children.   Are antibiotics needed?  If your sore throat is due to a bacterial infection, antibiotics may speed healing and prevent complications. Although group A streptococcus ("strep throat" or GAS) is the major treatable infection for a sore throat, GAS causes only 5% to 15% of sore throats in adults who seek medical care. Most sore throats are caused by cold or flu viruses. And antibiotics dont treat viral illness. In fact, using antibiotics when theyre not needed may produce bacteria that are harder to kill. Your healthcare provider will prescribe antibiotics only if he or she thinks they are likely to help.  If antibiotics are prescribed  Take the medicine exactly as directed. Be sure to finish your prescription even if youre feeling better. And be sure to ask your healthcare provider or pharmacist what side effects are common and what to do about them.  Is surgery needed?  In some cases, tonsils need to be removed. This is often done as outpatient (same-day) surgery. Your healthcare provider may advise removing the tonsils in cases of:  · Several severe bouts of tonsillitis in a year. Severe episodes include those that lead to missed days of school or work, or that need to be treated with antibiotics.  · Tonsillitis that causes breathing problems during sleep  · Tonsillitis caused by food particles collecting in pouches in the tonsils (cryptic tonsillitis)  Call your healthcare provider if any of the following occur:  · Symptoms worsen, or " new symptoms develop.  · Swollen tonsils make breathing difficult.  · The pain is severe enough to keep you from drinking liquids.  · A skin rash, hives, or wheezing develops. Any of these could signal an allergic reaction to antibiotics.  · Symptoms dont improve within a week.  · Symptoms dont improve within 2 to 3 days of starting antibiotics.   Date Last Reviewed: 10/1/2016  © 9068-1443 [employer]. [address] [place] [address], [place] [address]. All rights reserved. This information is not intended as a substitute for professional medical care. Always follow your healthcare professional's instructions.

## 2018-09-17 NOTE — PATIENT INSTRUCTIONS
Self-Care for Sore Throats    Sore throats happen for many reasons, such as colds, allergies, and infections caused by viruses or bacteria. In any case, your throat becomes red and sore. Your goal for self-care is to reduce your discomfort while giving your throat a chance to heal.  Moisten and soothe your throat  Tips include the following:  · Try a sip of water first thing after waking up.  · Keep your throat moist by drinking 6 or more glasses of clear liquids every day.  · Run a cool-air humidifier in your room overnight.  · Avoid cigarette smoke.   · Suck on throat lozenges, cough drops, hard candy, ice chips, or frozen fruit-juice bars. Use the sugar-free versions if your diet or medical condition requires them.  Gargle to ease irritation  Gargling every hour or 2 can ease irritation. Try gargling with 1 of these solutions:  · 1/4 teaspoon of salt in 1/2 cup of warm water  · An over-the-counter anesthetic gargle  Use medicine for more relief  Over-the-counter medicine can reduce sore throat symptoms. Ask your pharmacist if you have questions about which medicine to use:  · Ease pain with anesthetic sprays. Aspirin or an aspirin substitute also helps. Remember, never give aspirin to anyone 18 or younger, or if you are already taking blood thinners.   · For sore throats caused by allergies, try antihistamines to block the allergic reaction.  · Remember: unless a sore throat is caused by a bacterial infection, antibiotics wont help you.  Prevent future sore throats  Prevention tips include the following:  · Stop smoking or reduce contact with secondhand smoke. Smoke irritates the tender throat lining.  · Limit contact with pets and with allergy-causing substances, such as pollen and mold.  · When youre around someone with a sore throat or cold, wash your hands often to keep viruses or bacteria from spreading.  · Dont strain your vocal cords.  Call your healthcare provider  Contact your healthcare provider if  you have:  · A temperature over 101°F (38.3°C)  · White spots on the throat  · Great difficulty swallowing  · Trouble breathing  · A skin rash  · Recent exposure to someone else with strep bacteria  · Severe hoarseness and swollen glands in the neck or jaw   Date Last Reviewed: 8/1/2016  © 7276-5409 [employer]. [address] [place] [address]. All rights reserved. This information is not intended as a substitute for professional medical care. Always follow your healthcare professional's instructions.        When You Have a Sore Throat    A sore throat can be painful. There are many reasons why you may have a sore throat. Your healthcare provider will work with you to find the cause of your sore throat. He or she will also find the best treatment for you.  What causes a sore throat?  Sore throats can be caused or worsened by:  · Cold or flu viruses  · Bacteria  · Irritants such as tobacco smoke or air pollution  · Acid reflux  A healthy throat  The tonsils are on the sides of the throat near the base of the tongue. They collect viruses and bacteria and help fight infection. The throat (pharynx) is the passage for air. Mucus from the nasal cavity also moves down the passage.  An inflamed throat  The tonsils and pharynx can become inflamed due to a cold or flu virus. Postnasal drip (excess mucus draining from the nasal cavity) can irritate the throat. It can also make the throat or tonsils more likely to be infected by bacteria. Severe, untreated tonsillitis in children or adults can cause a pocket of pus (abscess) to form near the tonsil.  Your evaluation  A medical evaluation can help find the cause of your sore throat. It can also help your healthcare provider choose the best treatment for you. The evaluation may include a health history, physical exam, and diagnostic tests.  Health history  Your healthcare provider may ask you the following:  · How long has the sore throat lasted and how  have you been treating it?  · Do you have any other symptoms, such as body aches, fever, or cough?  · Does your sore throat recur? If so, how often? How many days of school or work have you missed because of a sore throat?  · Do you have trouble eating or swallowing?  · Have you been told that you snore or have other sleep problems?  · Do you have bad breath?  · Do you cough up bad-tasting mucus?  Physical exam  During the exam, your healthcare provider checks your ears, nose, and throat for problems. He or she also checks for swelling in the neck, and may listen to your chest.  Possible tests  Other tests your healthcare provider may perform include:  · A throat swab to check for bacteria such as streptococcus (the bacteria that causes strep throat)  · A blood test to check for mononucleosis (a viral infection)  · A chest X-ray to rule out pneumonia, especially if you have a cough  Treating a sore throat  Treatment depends on many factors. What is the likely cause? Is the problem recent? Does it keep coming back? In many cases, the best thing to do is to treat the symptoms, rest, and let the problem heal itself. Antibiotics may help clear up some bacterial infections. For cases of severe or recurring tonsillitis, the tonsils may need to be removed.  Relieving your symptoms  · Dont smoke, and avoid secondhand smoke.  · For children, try throat sprays or Popsicles. Adults and older children may try lozenges.  · Drink warm liquids to soothe the throat and help thin mucus. Avoid alcohol, spicy foods, and acidic drinks such as orange juice. These can irritate the throat.  · Gargle with warm saltwater (1 teaspoon of salt to 8 ounces of warm water).  · Use a humidifier to keep air moist and relieve throat dryness.  · Try over-the-counter pain relievers such as acetaminophen or ibuprofen. Use as directed, and dont exceed the recommended dose. Dont give aspirin to children.   Are antibiotics needed?  If your sore throat  "is due to a bacterial infection, antibiotics may speed healing and prevent complications. Although group A streptococcus ("strep throat" or GAS) is the major treatable infection for a sore throat, GAS causes only 5% to 15% of sore throats in adults who seek medical care. Most sore throats are caused by cold or flu viruses. And antibiotics dont treat viral illness. In fact, using antibiotics when theyre not needed may produce bacteria that are harder to kill. Your healthcare provider will prescribe antibiotics only if he or she thinks they are likely to help.  If antibiotics are prescribed  Take the medicine exactly as directed. Be sure to finish your prescription even if youre feeling better. And be sure to ask your healthcare provider or pharmacist what side effects are common and what to do about them.  Is surgery needed?  In some cases, tonsils need to be removed. This is often done as outpatient (same-day) surgery. Your healthcare provider may advise removing the tonsils in cases of:  · Several severe bouts of tonsillitis in a year. Severe episodes include those that lead to missed days of school or work, or that need to be treated with antibiotics.  · Tonsillitis that causes breathing problems during sleep  · Tonsillitis caused by food particles collecting in pouches in the tonsils (cryptic tonsillitis)  Call your healthcare provider if any of the following occur:  · Symptoms worsen, or new symptoms develop.  · Swollen tonsils make breathing difficult.  · The pain is severe enough to keep you from drinking liquids.  · A skin rash, hives, or wheezing develops. Any of these could signal an allergic reaction to antibiotics.  · Symptoms dont improve within a week.  · Symptoms dont improve within 2 to 3 days of starting antibiotics.   Date Last Reviewed: 10/1/2016  © 8161-0468 [employer]. [address] [place] [address], [place] [address]. All rights reserved. This information is not intended as a " substitute for professional medical care. Always follow your healthcare professional's instructions.

## 2019-03-18 NOTE — PROGRESS NOTES
[facility]: Patient will be followed by the CHF clinic for at least 30 days post hospitalization - providers Nia SAMPSON and Dr. Bustamante.      Please send a CHF update this am. Please report if patient has any:  -shortness of breath  - paroxymal nocturnal  - dyspnea  -cough  -dizziness  -syncope  -edema, in lower extremities  -if abdomen is feeling full or appear larger than patient normal    Please also send:  -current medication list; please note if patient is refusing any medications  -most recent weights for the past 7 days  -blood pressure and heart rate for the past 7 days  Please fax to [phone] by 10:00 AM this AM        Individual Pyschotherapy (PhD/LCSW)    7/16/2018    Time:    Site:  [facility]           Therapeutic Intervention: Outpatient - Insight oriented psychotherapy 45-50 min - CPT code 84594    Chief complaint/reason for encounter:  Adjustment d/o     Interval history and content of current session:  Pt continues to feel overwhelmed with multiple demands of work, children, finances, etc.  We discussed her tendencies to put excessive pressure on her self with goals and expectations that are unrealistic. Noted the way her fx of origin experiences pre-disposed her to take on to much responsibility and become and over-achiever. We discussed ways she can re-frame her expectations by focusing on the process (vs the outcome) and doing her best rather than castigating herself for ways that she could not control.     Treatment plan:  Target symptoms: Depressions, anxiety, irritability, self-esteem    Risk parameters:  Patient reports no suicidal ideation  Patient reports no homicidal ideation  Patient reports no self-injurious behavior  Patient reports no violent behavior    Verbal deficits: None    Patient's response to intervention:  The patient's response to intervention is accepting, motivated.    Progress toward goals and other mental status changes:  The patient's progress toward goals is fair    Diagnosis:309.28    Plan:  continue psychotherapy     Return to clinic: as scheduled.

## 2021-01-05 ENCOUNTER — CLINICAL SUPPORT (OUTPATIENT)
Dept: URGENT CARE | Facility: CLINIC | Age: 51
End: 2021-01-05
Payer: COMMERCIAL

## 2021-01-05 VITALS — HEART RATE: 81 BPM | OXYGEN SATURATION: 98 % | TEMPERATURE: 100 F

## 2021-01-05 DIAGNOSIS — Z20.822 EXPOSURE TO COVID-19 VIRUS: Primary | ICD-10-CM

## 2021-01-05 LAB
CTP QC/QA: YES
SARS-COV-2 RDRP RESP QL NAA+PROBE: NEGATIVE

## 2021-01-05 PROCEDURE — U0002 COVID-19 LAB TEST NON-CDC: HCPCS | Mod: QW,S$GLB,, | Performed by: STUDENT IN AN ORGANIZED HEALTH CARE EDUCATION/TRAINING PROGRAM

## 2021-01-05 PROCEDURE — 99211 PR OFFICE/OUTPT VISIT, EST, LEVL I: ICD-10-PCS | Mod: S$GLB,,, | Performed by: STUDENT IN AN ORGANIZED HEALTH CARE EDUCATION/TRAINING PROGRAM

## 2021-01-05 PROCEDURE — U0002: ICD-10-PCS | Mod: QW,S$GLB,, | Performed by: STUDENT IN AN ORGANIZED HEALTH CARE EDUCATION/TRAINING PROGRAM

## 2021-01-05 PROCEDURE — 99211 OFF/OP EST MAY X REQ PHY/QHP: CPT | Mod: S$GLB,,, | Performed by: STUDENT IN AN ORGANIZED HEALTH CARE EDUCATION/TRAINING PROGRAM

## 2021-04-15 ENCOUNTER — PATIENT MESSAGE (OUTPATIENT)
Dept: RESEARCH | Facility: HOSPITAL | Age: 51
End: 2021-04-15

## 2021-11-17 ENCOUNTER — OFFICE VISIT (OUTPATIENT)
Dept: URGENT CARE | Facility: CLINIC | Age: 51
End: 2021-11-17
Payer: COMMERCIAL

## 2021-11-17 VITALS
DIASTOLIC BLOOD PRESSURE: 79 MMHG | HEART RATE: 90 BPM | SYSTOLIC BLOOD PRESSURE: 156 MMHG | BODY MASS INDEX: 26.5 KG/M2 | OXYGEN SATURATION: 98 % | RESPIRATION RATE: 17 BRPM | TEMPERATURE: 99 F | WEIGHT: 135 LBS | HEIGHT: 60 IN

## 2021-11-17 DIAGNOSIS — B96.89 ACUTE BACTERIAL SINUSITIS: Primary | ICD-10-CM

## 2021-11-17 DIAGNOSIS — J01.90 ACUTE BACTERIAL SINUSITIS: Primary | ICD-10-CM

## 2021-11-17 PROCEDURE — 1159F PR MEDICATION LIST DOCUMENTED IN MEDICAL RECORD: ICD-10-PCS | Mod: CPTII,S$GLB,, | Performed by: NURSE PRACTITIONER

## 2021-11-17 PROCEDURE — 99213 PR OFFICE/OUTPT VISIT, EST, LEVL III, 20-29 MIN: ICD-10-PCS | Mod: S$GLB,,, | Performed by: NURSE PRACTITIONER

## 2021-11-17 PROCEDURE — 3078F DIAST BP <80 MM HG: CPT | Mod: CPTII,S$GLB,, | Performed by: NURSE PRACTITIONER

## 2021-11-17 PROCEDURE — 3008F BODY MASS INDEX DOCD: CPT | Mod: CPTII,S$GLB,, | Performed by: NURSE PRACTITIONER

## 2021-11-17 PROCEDURE — 3077F SYST BP >= 140 MM HG: CPT | Mod: CPTII,S$GLB,, | Performed by: NURSE PRACTITIONER

## 2021-11-17 PROCEDURE — 1159F MED LIST DOCD IN RCRD: CPT | Mod: CPTII,S$GLB,, | Performed by: NURSE PRACTITIONER

## 2021-11-17 PROCEDURE — 3078F PR MOST RECENT DIASTOLIC BLOOD PRESSURE < 80 MM HG: ICD-10-PCS | Mod: CPTII,S$GLB,, | Performed by: NURSE PRACTITIONER

## 2021-11-17 PROCEDURE — 3008F PR BODY MASS INDEX (BMI) DOCUMENTED: ICD-10-PCS | Mod: CPTII,S$GLB,, | Performed by: NURSE PRACTITIONER

## 2021-11-17 PROCEDURE — 99213 OFFICE O/P EST LOW 20 MIN: CPT | Mod: S$GLB,,, | Performed by: NURSE PRACTITIONER

## 2021-11-17 PROCEDURE — 3077F PR MOST RECENT SYSTOLIC BLOOD PRESSURE >= 140 MM HG: ICD-10-PCS | Mod: CPTII,S$GLB,, | Performed by: NURSE PRACTITIONER

## 2021-11-17 RX ORDER — FLUTICASONE PROPIONATE 50 MCG
1 SPRAY, SUSPENSION (ML) NASAL DAILY
Qty: 9.9 ML | Refills: 0 | Status: SHIPPED | OUTPATIENT
Start: 2021-11-17

## 2021-11-17 RX ORDER — AMOXICILLIN AND CLAVULANATE POTASSIUM 875; 125 MG/1; MG/1
1 TABLET, FILM COATED ORAL EVERY 12 HOURS
Qty: 14 TABLET | Refills: 0 | Status: SHIPPED | OUTPATIENT
Start: 2021-11-17 | End: 2021-11-24

## 2023-10-31 ENCOUNTER — PATIENT OUTREACH (OUTPATIENT)
Dept: ADMINISTRATIVE | Facility: HOSPITAL | Age: 53
End: 2023-10-31
Payer: COMMERCIAL

## 2023-10-31 ENCOUNTER — OFFICE VISIT (OUTPATIENT)
Dept: PRIMARY CARE CLINIC | Facility: CLINIC | Age: 53
End: 2023-10-31
Payer: COMMERCIAL

## 2023-10-31 VITALS
OXYGEN SATURATION: 99 % | HEART RATE: 76 BPM | HEIGHT: 60 IN | BODY MASS INDEX: 29.78 KG/M2 | SYSTOLIC BLOOD PRESSURE: 150 MMHG | TEMPERATURE: 99 F | WEIGHT: 151.69 LBS | DIASTOLIC BLOOD PRESSURE: 90 MMHG

## 2023-10-31 DIAGNOSIS — J20.9 SUBACUTE BRONCHITIS: Primary | ICD-10-CM

## 2023-10-31 DIAGNOSIS — Z76.89 ENCOUNTER TO ESTABLISH CARE WITH NEW DOCTOR: ICD-10-CM

## 2023-10-31 DIAGNOSIS — R03.0 ELEVATED BLOOD-PRESSURE READING WITHOUT DIAGNOSIS OF HYPERTENSION: ICD-10-CM

## 2023-10-31 PROCEDURE — 99999 PR PBB SHADOW E&M-EST. PATIENT-LVL V: CPT | Mod: PBBFAC,,,

## 2023-10-31 PROCEDURE — 99204 OFFICE O/P NEW MOD 45 MIN: CPT | Mod: S$GLB,,,

## 2023-10-31 PROCEDURE — 99999 PR PBB SHADOW E&M-EST. PATIENT-LVL V: ICD-10-PCS | Mod: PBBFAC,,,

## 2023-10-31 PROCEDURE — 99204 PR OFFICE/OUTPT VISIT, NEW, LEVL IV, 45-59 MIN: ICD-10-PCS | Mod: S$GLB,,,

## 2023-10-31 RX ORDER — DEXTROAMPHETAMINE SACCHARATE, AMPHETAMINE ASPARTATE, DEXTROAMPHETAMINE SULFATE AND AMPHETAMINE SULFATE 2.5; 2.5; 2.5; 2.5 MG/1; MG/1; MG/1; MG/1
10 TABLET ORAL
COMMUNITY
Start: 2023-10-30 | End: 2023-11-29

## 2023-10-31 RX ORDER — ALBUTEROL SULFATE 90 UG/1
2 AEROSOL, METERED RESPIRATORY (INHALATION) EVERY 6 HOURS PRN
Qty: 18 G | Refills: 0 | Status: SHIPPED | OUTPATIENT
Start: 2023-10-31 | End: 2024-10-30

## 2023-10-31 RX ORDER — BENZONATATE 200 MG/1
200 CAPSULE ORAL 3 TIMES DAILY PRN
Qty: 30 CAPSULE | Refills: 1 | Status: SHIPPED | OUTPATIENT
Start: 2023-10-31 | End: 2023-11-20

## 2023-10-31 RX ORDER — LISDEXAMFETAMINE DIMESYLATE 30 MG/1
30 CAPSULE ORAL
COMMUNITY
Start: 2023-10-30

## 2023-10-31 RX ORDER — AZITHROMYCIN 250 MG/1
TABLET, FILM COATED ORAL
Qty: 6 EACH | Refills: 0 | Status: SHIPPED | OUTPATIENT
Start: 2023-10-31 | End: 2023-11-04

## 2023-10-31 NOTE — PATIENT INSTRUCTIONS
Symptomatic treatment:    PLAIN mucinex 1200 mg twice a day  Tylenol/Motrin for pain/fever  Chloraseptic spray  Throat lozenges  Hot lemon/honey and/or tea  Gargle warm salt water   Saline nasal spray  Drink plenty water, fluids for rehydration, pedialyte       Eat healthy, 5 fresh fruits & vegetables daily to strengthen your immune system. Exercise brings oxygen to all the cells of your body, also making your body strong to fight anything that comes your way.  Move more, sit less  Fresh foods, high fiber, low fat     --------------------------  WATER BALANCE-  Your body systems work best with 64 ounces DAILY  (equal to 8 cups or a HALF A GALLON DAILY)   - to flush out impurities & cleanse our organs.   Drink a full glass of water before hamilton cup of coffe, tea or caffeinated soft drink    For every 8 ounces of caffeine you drink, ADD ANOTHER CUP of water to your daily water needs. (2 cups of coffee and one diet coke = you need 11 glasses of water a day). We were not made to drink sugary drinks  - not even artificial sweeteners.   You'll notice a difference in your brain function, energy level , instestinal tract & overall wellness. Your liver & kidney filters will thank you too for keeping them properly cleansed  ---------------------------

## 2023-10-31 NOTE — PROGRESS NOTES
[facility] Note    Chief Complaint      Chief Complaint   Patient presents with    Cough    Nasal Congestion    Headache     History of Present Illness      Rebeca Birch is a 52 y.o. female patient of  who presents today for cough, nasal congestion x2 weeks, headaches x 1 week.  This patient is new to me.  Associated symptoms:  Sinus pain/pressure, scratchy throat, cough, slight wheezing.  Patient denies any fever, chills, fatigue, ear pain, sore throat, visual disturbances, CP, SOB, palpitations, dizziness, abdominal pain.  Treatments tried: Mucinex, Zyrtec, Flonase, Tylenol sinus, saltwater gargle.  These treatments helped, but symptoms would return.  Symptoms occurred both day and night.  Patient has no complaints of pain during today's visit.    Associated factors:  Patient's son was recently diagnosed with the flu and a bacterial infection.    Health Maintenance   Topic Date Due    Hepatitis C Screening  Never done    Lipid Panel  Never done    TETANUS VACCINE  Never done    Colorectal Cancer Screening  Never done    Mammogram  2020    Shingles Vaccine (1 of 2) Never done       Past Medical History:   Diagnosis Date    ADHD (attention deficit hyperactivity disorder)        Past Surgical History:   Procedure Laterality Date    ADENOIDECTOMY       SECTION      TONSILLECTOMY         family history is not on file.    Social History     Tobacco Use    Smoking status: Never    Smokeless tobacco: Never   Substance Use Topics    Alcohol use: Yes       Review of Systems   Constitutional:  Negative for chills, fever and malaise/fatigue.   HENT:  Positive for congestion and sinus pain. Negative for ear discharge, ear pain and sore throat.         Scratchy throat   Eyes: Negative.    Respiratory:  Positive for cough, sputum production and wheezing. Negative for shortness of breath.         Slight wheeze last week     Cardiovascular:  Negative for chest pain and palpitations.    Gastrointestinal: Negative.    Genitourinary: Negative.    Musculoskeletal: Negative.    Neurological:  Positive for headaches. Negative for dizziness and loss of consciousness.   Psychiatric/Behavioral: Negative.          Outpatient Encounter Medications as of 10/31/2023   Medication Sig Dispense Refill    dextroamphetamine-amphetamine 10 mg Tab Take 10 mg by mouth.      fluticasone propionate (FLONASE) 50 mcg/actuation nasal spray 1 spray (50 mcg total) by Each Nostril route once daily. 9.9 mL 0    lisdexamfetamine (VYVANSE) 30 MG capsule Take 30 mg by mouth every morning.      lisdexamfetamine (VYVANSE) 30 MG capsule Take 30 mg by mouth.      albuterol (VENTOLIN HFA) 90 mcg/actuation inhaler Inhale 2 puffs into the lungs every 6 (six) hours as needed for Wheezing. Rescue 18 g 0    [] azithromycin (Z-FLORESITA) 250 MG tablet Take 2 tablets (500 mg total) by mouth once daily for 1 day, THEN 1 tablet (250 mg total) once daily for 4 days. 6 each 0    benzonatate (TESSALON) 200 MG capsule Take 1 capsule (200 mg total) by mouth 3 (three) times daily as needed for Cough. 30 capsule 1    [DISCONTINUED] MYDAYIS 25 mg CT24 TK 1 C PO QAM  0     No facility-administered encounter medications on file as of 10/31/2023.        Review of patient's allergies indicates:  No Known Allergies    Physical Exam      Vital Signs  Temp: 98.9 °F (37.2 °C)  Pulse: 76  SpO2: 99 %  BP: (!) 150/90  BP Location: Left arm  Patient Position: Sitting  Pain Score: 0-No pain  Height and Weight  Height: 5' (152.4 cm)  Weight: 68.8 kg (151 lb 10.8 oz)  BSA (Calculated - sq m): 1.71 sq meters  BMI (Calculated): 29.6  Weight in (lb) to have BMI = 25: 127.7    Physical Exam  Constitutional:       Appearance: Normal appearance.   HENT:      Head: Normocephalic and atraumatic.      Right Ear: Tympanic membrane, ear canal and external ear normal.      Left Ear: Tympanic membrane, ear canal and external ear normal.      Nose: Congestion present.       "Mouth/Throat:      Mouth: Mucous membranes are dry.      Pharynx: Posterior oropharyngeal erythema present.   Cardiovascular:      Rate and Rhythm: Normal rate and regular rhythm.      Pulses: Normal pulses.      Heart sounds: Normal heart sounds.   Pulmonary:      Effort: Pulmonary effort is normal.      Breath sounds: Examination of the right-middle field reveals decreased breath sounds. Examination of the right-lower field reveals decreased breath sounds. Examination of the left-lower field reveals decreased breath sounds. Decreased breath sounds present.   Neurological:      Mental Status: She is alert.          Laboratory:  CBC:  No results found for: "WBC", "RBC", "HGB", "HCT", "PLT", "MCV", "MCH", "MCHC"  CMP:  No results found for: "GLU", "CALCIUM", "ALBUMIN", "PROT", "NA", "K", "CO2", "CL", "BUN", "ALKPHOS", "ALT", "AST", "BILITOT"  URINALYSIS:  No results found for: "COLORU", "CLARITYU", "SPECGRAV", "PHUR", "PROTEINUA", "GLUCOSEU", "BILIRUBINCON", "BLOODU", "WBCU", "RBCU", "BACTERIA", "MUCUS", "NITRITE", "LEUKOCYTESUR", "UROBILINOGEN", "HYALINECASTS"   LIPIDS:  No results found for: "TSH", "HDL", "CHOL", "TRIG", "LDLCALC", "CHOLHDL", "NONHDLCHOL", "TOTALCHOLEST"  TSH:  No results found for: "TSH"  A1C:  No results found for: "HGBA1C"      Assessment/Plan     Rebeca Birch is a 52 y.o.female with:      1. Subacute bronchitis  -     azithromycin (Z-FLORESITA) 250 MG tablet; Take 2 tablets (500 mg total) by mouth once daily for 1 day, THEN 1 tablet (250 mg total) once daily for 4 days.  Dispense: 6 each; Refill: 0  -     albuterol (VENTOLIN HFA) 90 mcg/actuation inhaler; Inhale 2 puffs into the lungs every 6 (six) hours as needed for Wheezing. Rescue  Dispense: 18 g; Refill: 0  -     benzonatate (TESSALON) 200 MG capsule; Take 1 capsule (200 mg total) by mouth 3 (three) times daily as needed for Cough.  Dispense: 30 capsule; Refill: 1    2. Encounter to establish care with new doctor  -     Ambulatory " referral/consult to Internal Medicine; Future; Expected date: 11/07/2023    3. Elevated blood-pressure reading without diagnosis of hypertension   -patient to adhere to lifestyle changes including:  Reduction of salt intake, reduction of alcohol intake, increase exercise.  Patient stated she would prefer to not take blood pressure medicine if possible.  We will follow up in about 4 weeks to re-evaluate blood pressure    I spent 45 minutes on the day of this encounter for preparing for, evaluating, treating, and managing this patient.      -Continue current medications and maintain follow up with specialists.  Return to clinic in Follow up in about 4 weeks (around 11/28/2023).       Antoinette Key NP  [facility]    Portions of this note may have been generated using voice recognition software.  Please excuse any spelling/grammatical errors. Occasional wrong-word or sound-a-like substitutions may have also occurred due to the inherent limitations of voice recognition software. Please read the chart carefully and recognize, using context, where substitutions have occurred.

## 2023-10-31 NOTE — PROGRESS NOTES
Health Maintenance Due   Topic Date Due    Hepatitis C Screening  Never done    Lipid Panel  Never done    HIV Screening  Never done    TETANUS VACCINE  Never done    Hemoglobin A1c (Diabetic Prevention Screening)  Never done    Colorectal Cancer Screening  Never done    Mammogram  06/13/2020    Shingles Vaccine (1 of 2) Never done    Cervical Cancer Screening  08/29/2022    Influenza Vaccine (1) 09/01/2023    COVID-19 Vaccine (3 - 2023-24 season) 09/01/2023     Chart reviewed.   Immunizations: Reconciled  Orders placed: N/A  Upcoming appts to satisfy ROYCE topics: N/A

## 2024-06-06 ENCOUNTER — OFFICE VISIT (OUTPATIENT)
Dept: URGENT CARE | Facility: CLINIC | Age: 54
End: 2024-06-06
Payer: COMMERCIAL

## 2024-06-06 VITALS
HEIGHT: 60 IN | OXYGEN SATURATION: 99 % | HEART RATE: 80 BPM | TEMPERATURE: 98 F | SYSTOLIC BLOOD PRESSURE: 160 MMHG | RESPIRATION RATE: 18 BRPM | WEIGHT: 151 LBS | DIASTOLIC BLOOD PRESSURE: 82 MMHG | BODY MASS INDEX: 29.64 KG/M2

## 2024-06-06 DIAGNOSIS — S76.312A HAMSTRING STRAIN, LEFT, INITIAL ENCOUNTER: Primary | ICD-10-CM

## 2024-06-06 PROBLEM — N60.12 DIFFUSE CYSTIC MASTOPATHY OF BOTH BREASTS: Status: ACTIVE | Noted: 2019-08-29

## 2024-06-06 PROBLEM — N60.11 DIFFUSE CYSTIC MASTOPATHY OF BOTH BREASTS: Status: ACTIVE | Noted: 2019-08-29

## 2024-06-06 PROCEDURE — 99213 OFFICE O/P EST LOW 20 MIN: CPT | Mod: S$GLB,,, | Performed by: PHYSICIAN ASSISTANT

## 2024-06-06 RX ORDER — MELOXICAM 15 MG/1
15 TABLET ORAL DAILY
Qty: 7 TABLET | Refills: 0 | Status: SHIPPED | OUTPATIENT
Start: 2024-06-06 | End: 2024-06-07

## 2024-06-06 RX ORDER — DEXTROAMPHETAMINE SACCHARATE, AMPHETAMINE ASPARTATE, DEXTROAMPHETAMINE SULFATE AND AMPHETAMINE SULFATE 2.5; 2.5; 2.5; 2.5 MG/1; MG/1; MG/1; MG/1
10 TABLET ORAL
COMMUNITY
Start: 2024-06-06 | End: 2024-07-06

## 2024-06-06 NOTE — PROGRESS NOTES
Subjective:      Patient ID: Rebeca Birch is a 53 y.o. female.    Vitals:  height is 5' (1.524 m) and weight is 68.5 kg (151 lb). Her oral temperature is 98.3 °F (36.8 °C). Her blood pressure is 160/82 (abnormal) and her pulse is 80. Her respiration is 18 and oxygen saturation is 99%.     Chief Complaint: Leg Injury    This is a 53 y.o. female who presents today with a chief complaint of left  leg injury. Patient injured the back of her leg on [place] Day. Patient is having from her glute to her ankle. Patient has a bruise behind her knee.  Pain is achy and dull.  Patient t went on a trip and was on high heels with lots of walking and thinks she re-injured it.  Localized bruising noted no trauma.  Patient has no pain with walking no swelling no bony tenderness no trauma.    Injury  This is a new problem. The current episode started 1 to 4 weeks ago. The problem occurs constantly. Associated symptoms include myalgias. Pertinent negatives include no arthralgias, chills, fever, joint swelling, numbness, rash or weakness. The symptoms are aggravated by walking and bending. She has tried NSAIDs and ice (Ibuprofen. biofreeze) for the symptoms. The treatment provided mild relief.       Constitution: Negative for chills and fever.   Musculoskeletal:  Positive for pain and muscle ache. Negative for trauma, joint pain, joint swelling, abnormal ROM of joint, back pain and muscle cramps.   Skin:  Positive for bruising. Negative for rash and erythema.   Neurological:  Negative for numbness, tingling and tremors.      Past Medical History:   Diagnosis Date    ADHD (attention deficit hyperactivity disorder)        Past Surgical History:   Procedure Laterality Date    ADENOIDECTOMY       SECTION      TONSILLECTOMY         No family history on file.    Social History     Socioeconomic History    Marital status:    Tobacco Use    Smoking status: Never    Smokeless tobacco: Never   Substance and Sexual Activity     Alcohol use: Yes     Social Determinants of Health     Financial Resource Strain: Low Risk  (3/24/2023)    [place] [facility] [employer] [facility]    Overall Financial Resource Strain (CARDIA)     Difficulty of Paying Living Expenses: Not hard at all   Food Insecurity: No Food Insecurity (3/24/2023)    Received [place] [facility] [employer] [facility]    Hunger Vital Sign     Worried About Running Out of Food in the Last Year: Never true     Ran Out of Food in the Last Year: Never true   Transportation Needs: No Transportation Needs (3/24/2023)    Received from [facility] [employer] [facility]    PRAPARE - Transportation     Lack of Transportation (Medical): No     Lack of Transportation (Non-Medical): No   Physical Activity: Insufficiently Active (3/24/2023)    Received from [facility] [employer] [facility]    Exercise Vital Sign     Days of Exercise per Week: 1 day     Minutes of Exercise per Session: 40 min   Stress: Stress Concern Present (3/24/2023)    Received from [facility] [employer] [facility]    [employer] - Occupational Stress Questionnaire     Feeling of Stress : Rather much   Housing Stability: Low Risk  (3/24/2023)    Received from [facility] [employer] [facility]    Housing Stability Vital Sign     Unable to Pay for Housing in the Last Year: No     Number of Places Lived in the Last Year: 1     In the last 12 months, was there a time when you did not have a steady place to sleep or slept in a shelter (including now)?: No       Current Outpatient Medications   Medication Sig Dispense Refill    dextroamphetamine-amphetamine 10 mg Tab Take 10 mg by mouth.      fluticasone propionate (FLONASE) 50 mcg/actuation nasal spray 1 spray (50 mcg total) by Each Nostril route once daily. 9.9 mL 0    lisdexamfetamine (VYVANSE) 30 MG capsule Take 30 mg by mouth every morning.      lisdexamfetamine (VYVANSE) 30 MG capsule Take 30 mg by mouth.      meloxicam (MOBIC) 15 MG tablet Take 1 tablet (15 mg total) by mouth once daily. for  7 days 7 tablet 0     No current facility-administered medications for this visit.       Review of patient's allergies indicates:  No Known Allergies    Objective:     Physical Exam   Constitutional: She is oriented to person, place, and time.  Non-toxic appearance. She does not appear ill. No distress.   Cardiovascular: Normal rate, regular rhythm and normal pulses.   Pulmonary/Chest: Effort normal. No respiratory distress.   Abdominal: Normal appearance.   Musculoskeletal: Normal range of motion.         General: Tenderness present. No swelling, deformity or signs of injury. Normal range of motion.      Left hip: Normal.      Left knee: Normal.      Left ankle: Normal.      Left upper leg: She exhibits tenderness. She exhibits no bony tenderness, no swelling, no edema, no deformity and no laceration.      Left lower leg: Normal.        Legs:    Neurological: no focal deficit. She is alert and oriented to person, place, and time. She displays no weakness. No sensory deficit. Coordination and gait normal.   Skin: Skin is warm, dry, not diaphoretic, not pale and no rash. Capillary refill takes less than 2 seconds. not left upper legbruising No erythema   Psychiatric: Her behavior is normal. Mood, judgment and thought content normal.   Nursing note and vitals reviewed.      Assessment:     1. Hamstring strain, left, initial encounter        Plan:       Hamstring strain, left, initial encounter  -     meloxicam (MOBIC) 15 MG tablet; Take 1 tablet (15 mg total) by mouth once daily. for 7 days  Dispense: 7 tablet; Refill: 0    I have reviewed the patient chart and pertinent past imaging/labs.  Pa-[facility]    No indication for x-ray  Patient Instructions   Use Mobic as prescribed with food it is safe to take Tylenol with this medication do not take ibuprofen or Advil.  Ice the muscle for 15 minutes at a time 4 to 5 times a day.  Use warm heat on the bruise to help facilitate reabsorption.  Follow up with  your primary care provider no indication for x-ray

## 2024-06-06 NOTE — PATIENT INSTRUCTIONS
Use Mobic as prescribed with food it is safe to take Tylenol with this medication do not take ibuprofen or Advil.  Ice the muscle for 15 minutes at a time 4 to 5 times a day.  Use warm heat on the bruise to help facilitate reabsorption.  Follow up with your primary care provider no indication for x-ray

## 2024-06-07 ENCOUNTER — OFFICE VISIT (OUTPATIENT)
Dept: URGENT CARE | Facility: CLINIC | Age: 54
End: 2024-06-07
Payer: COMMERCIAL

## 2024-06-07 VITALS
DIASTOLIC BLOOD PRESSURE: 72 MMHG | RESPIRATION RATE: 20 BRPM | BODY MASS INDEX: 29.64 KG/M2 | SYSTOLIC BLOOD PRESSURE: 116 MMHG | OXYGEN SATURATION: 98 % | WEIGHT: 151 LBS | HEART RATE: 81 BPM | HEIGHT: 60 IN | TEMPERATURE: 98 F

## 2024-06-07 DIAGNOSIS — T78.3XXA ANGIOEDEMA, INITIAL ENCOUNTER: ICD-10-CM

## 2024-06-07 DIAGNOSIS — T78.40XA ALLERGIC REACTION, INITIAL ENCOUNTER: Primary | ICD-10-CM

## 2024-06-07 PROCEDURE — 96372 THER/PROPH/DIAG INJ SC/IM: CPT | Mod: S$GLB,,, | Performed by: FAMILY MEDICINE

## 2024-06-07 PROCEDURE — 99214 OFFICE O/P EST MOD 30 MIN: CPT | Mod: 25,S$GLB,, | Performed by: NURSE PRACTITIONER

## 2024-06-07 RX ORDER — METHYLPREDNISOLONE SOD SUCC 125 MG
125 VIAL (EA) INJECTION
Status: DISCONTINUED | OUTPATIENT
Start: 2024-06-07 | End: 2024-06-07

## 2024-06-07 RX ORDER — METHYLPREDNISOLONE SOD SUCC 125 MG
125 VIAL (EA) INJECTION
Status: COMPLETED | OUTPATIENT
Start: 2024-06-07 | End: 2024-06-07

## 2024-06-07 RX ADMIN — Medication 125 MG: at 09:06

## 2024-06-07 NOTE — PROGRESS NOTES
Subjective:      Patient ID: Rebeca Birch is a 53 y.o. female.    Vitals:  height is 5' (1.524 m) and weight is 68.5 kg (151 lb). Her temperature is 98.4 °F (36.9 °C). Her blood pressure is 116/72 and her pulse is 81. Her respiration is 20 and oxygen saturation is 98%.     Chief Complaint: Allergic Reaction    This is a 53 y.o. female   who presents today with a chief complaint of an allergic reaction. She states that she began taking meloxicam a day ago and then begam having swelling and tingling in her face. She's been taking benadryl to help relieve her symptoms.     Allergic Reaction  This is a new problem. The current episode started today. The problem has been gradually worsening since onset. The problem is mild. It is unknown and a prescription drug what she was exposed to. The patient was exposed to unknown and a prescription drug. Associated symptoms include skin blistering. Pertinent negatives include no abdominal pain, chest pain, chest pressure, coughing, diarrhea, difficulty breathing, drooling, eye itching, eye redness, eye watering, globus sensation, hyperventilation, itching, rash, stridor, trouble swallowing, vomiting or wheezing. Treatments tried: benadryl. The treatment provided no relief. Swelling is present on the face.     Constitution: Negative for chills, fatigue and fever.   HENT:  Negative for drooling and trouble swallowing.    Cardiovascular:  Negative for chest pain.   Eyes:  Negative for eye itching and eye redness.   Respiratory:  Negative for cough, stridor and wheezing.    Gastrointestinal:  Negative for abdominal pain, vomiting and diarrhea.   Skin:  Negative for rash and erythema.      Objective:     Physical Exam   Constitutional: She is oriented to person, place, and time. She appears well-developed.   HENT:   Head: Normocephalic and atraumatic. Head is without abrasion, without contusion and without laceration.   Ears:   Right Ear: External ear normal.   Left Ear: External  ear normal.   Nose: Nose normal.   Mouth/Throat: Uvula is midline, oropharynx is clear and moist and mucous membranes are normal. No oral lesions. Normal dentition. No uvula swelling. No oropharyngeal exudate, posterior oropharyngeal edema or posterior oropharyngeal erythema.       Eyes: Conjunctivae, EOM and lids are normal. Pupils are equal, round, and reactive to light.   Neck: Trachea normal and phonation normal. Neck supple.   Cardiovascular: Normal rate, regular rhythm and normal heart sounds.   Pulmonary/Chest: Effort normal and breath sounds normal. No stridor. No respiratory distress. She has no wheezes.   Musculoskeletal: Normal range of motion.         General: Normal range of motion.   Neurological: She is alert and oriented to person, place, and time.   Skin: Skin is warm, dry, intact and no rash. Capillary refill takes less than 2 seconds. No abrasion, No burn, No bruising, No erythema and No ecchymosis   Psychiatric: Her speech is normal and behavior is normal. Judgment and thought content normal.   Nursing note and vitals reviewed.      Assessment:     1. Allergic reaction, initial encounter    2. Angioedema, initial encounter        Plan:     Patient presents with c/o allergic reaction 1 hour after taking Meloxicam yesterday evening.  She notes that she started to have tingling to her lower lip and then noticed swelling with swelling to her face and eyelids.  She has blistering on her chin as well with no mucous membrane blistering.  Denies a hx of allergies to NSAIDs.  No rash.  No sore throat, body aches, malaise, of fever.  SJS is in the differential and considered however I am not suspecting this at this time.  She was given strict ER precautions if the symptoms worsen however to go to the nearest emergency room.  No respiratory complaints or complications.  She notes that the benadryl taken twice yesterday did help some.  She is otherwise well appearing.  Facial swelling is moderate to mild.   Case discussed and reviewed in clinic with Dr. Simmons as well who agrees to MDM.  Will treat for angioedema s/t allergic reaction to drug for now.   Allergic reaction, initial encounter  -     Discontinue: methylPREDNISolone sodium succinate injection 125 mg  -     methylPREDNISolone sodium succinate injection 125 mg    Angioedema, initial encounter      Patient Instructions                                                                           Allergic Reaction   If your condition worsens or fails to improve we recommend that you receive another evaluation at the ER immediately or contact your PCP to discuss your concerns or return here. You must understand that you've received an urgent care treatment only and that you may be released before all your medical problems are known or treated. You the patient will arrange for followup care as instructed.   Zyrtec 10 mg should be used daily for the next 5-7 days to prevent or suppress the itching with oral Pepcid daily. You can take Benadryl 25 mg at bedtime as well.   If you develop additional symptoms such as tongue swelling, rash, blisters to mucous membranes, or trouble breathing go immediately to the ER.

## 2024-06-07 NOTE — PATIENT INSTRUCTIONS
Allergic Reaction   If your condition worsens or fails to improve we recommend that you receive another evaluation at the ER immediately or contact your PCP to discuss your concerns or return here. You must understand that you've received an urgent care treatment only and that you may be released before all your medical problems are known or treated. You the patient will arrange for followup care as instructed.   Zyrtec 10 mg should be used daily for the next 5-7 days to prevent or suppress the itching with oral Pepcid daily. You can take Benadryl 25 mg at bedtime as well.   If you develop additional symptoms such as tongue swelling, rash, blisters to mucous membranes, or trouble breathing go immediately to the ER.

## 2024-06-11 ENCOUNTER — OFFICE VISIT (OUTPATIENT)
Dept: URGENT CARE | Facility: CLINIC | Age: 54
End: 2024-06-11
Payer: COMMERCIAL

## 2024-06-11 VITALS
HEIGHT: 60 IN | OXYGEN SATURATION: 98 % | DIASTOLIC BLOOD PRESSURE: 84 MMHG | RESPIRATION RATE: 18 BRPM | WEIGHT: 151 LBS | SYSTOLIC BLOOD PRESSURE: 177 MMHG | BODY MASS INDEX: 29.64 KG/M2 | HEART RATE: 94 BPM | TEMPERATURE: 99 F

## 2024-06-11 DIAGNOSIS — S92.525A CLOSED NONDISPLACED FRACTURE OF MIDDLE PHALANX OF LESSER TOE OF LEFT FOOT, INITIAL ENCOUNTER: Primary | ICD-10-CM

## 2024-06-11 DIAGNOSIS — S99.922A INJURY OF TOE ON LEFT FOOT, INITIAL ENCOUNTER: ICD-10-CM

## 2024-06-11 PROCEDURE — 99212 OFFICE O/P EST SF 10 MIN: CPT | Mod: S$GLB,,,

## 2024-06-11 PROCEDURE — 73660 X-RAY EXAM OF TOE(S): CPT | Mod: FY,LT,S$GLB, | Performed by: RADIOLOGY

## 2024-06-11 NOTE — PROGRESS NOTES
Subjective:      Patient ID: Rebeca Birch is a 53 y.o. female.    Vitals:  height is 5' (1.524 m) and weight is 68.5 kg (151 lb). Her oral temperature is 98.7 °F (37.1 °C). Her blood pressure is 177/84 (abnormal) and her pulse is 94. Her respiration is 18 and oxygen saturation is 98%.     Chief Complaint: Toe Injury    53-year-old female patient presents with swelling, redness, mild purple discoloration to her 3rd left toe after she slipped and hit her toe against the tub.  Patient has been essence taping her toe but reports swelling has not subsided.  Patient has been applying ice and NSAIDs with mild relief.        Toe Injury  This is a new problem. The current episode started in the past 7 days. The problem occurs constantly. The problem has been gradually worsening. Associated symptoms include arthralgias and joint swelling. Pertinent negatives include no abdominal pain, anorexia, change in bowel habit, chest pain, chills, congestion, coughing, diaphoresis, fatigue, fever, headaches, myalgias, nausea, neck pain, numbness, rash, sore throat, swollen glands, urinary symptoms, vertigo, visual change, vomiting or weakness. The symptoms are aggravated by walking. She has tried NSAIDs and ice for the symptoms. The treatment provided mild relief.       Constitution: Negative for activity change, appetite change, chills, sweating, fatigue and fever.   HENT:  Negative for ear pain, ear discharge, foreign body in ear, tinnitus, congestion and sore throat.    Neck: Negative for neck pain, neck stiffness and painful lymph nodes.   Cardiovascular:  Negative for chest trauma, chest pain and leg swelling.   Eyes:  Negative for eye trauma, foreign body in eye, eye discharge and eye itching.   Respiratory:  Negative for sleep apnea, chest tightness, cough, sputum production, bloody sputum and COPD.    Gastrointestinal:  Negative for abdominal trauma, abdominal pain, abdominal bloating, history of abdominal surgery, nausea and  vomiting.   Endocrine: hair loss, cold intolerance and heat intolerance.   Genitourinary:  Negative for dysuria, frequency and urgency.   Musculoskeletal:  Positive for joint pain, joint swelling and abnormal ROM of joint. Negative for pain, trauma, arthritis and muscle ache.   Skin:  Positive for bruising. Negative for color change, pale, rash, erythema and abscess.   Allergic/Immunologic: Negative for environmental allergies, seasonal allergies and food allergies.   Neurological:  Negative for dizziness, history of vertigo, light-headedness, passing out, facial drooping, headaches, disorientation, altered mental status and numbness.   Hematologic/Lymphatic: Negative for swollen lymph nodes, easy bruising/bleeding and trouble clotting. Does not bruise/bleed easily.   Psychiatric/Behavioral:  Negative for altered mental status, disorientation and confusion.       Objective:     Physical Exam   Constitutional: She is oriented to person, place, and time. She appears well-developed. She is cooperative. No distress.   HENT:   Head: Normocephalic and atraumatic.   Nose: Nose normal.   Mouth/Throat: Oropharynx is clear and moist and mucous membranes are normal.   Eyes: Conjunctivae and lids are normal.   Neck: Trachea normal and phonation normal. Neck supple.   Cardiovascular: Normal rate, regular rhythm, normal heart sounds and normal pulses.   Pulmonary/Chest: Effort normal and breath sounds normal.   Abdominal: Normal appearance and bowel sounds are normal. She exhibits no mass. Soft.   Musculoskeletal:         General: Swelling, tenderness and signs of injury present. No deformity.        Feet:    Neurological: She is alert and oriented to person, place, and time. She has normal strength and normal reflexes. No sensory deficit.   Skin: Skin is warm, dry, intact, not diaphoretic, not pale and no rash. bruising No erythema jaundice  Psychiatric: Her speech is normal and behavior is normal. Judgment and thought content  normal.   Nursing note and vitals reviewed.    X-Ray Toe 2 or More Views Left    Result Date: 6/11/2024  EXAMINATION: XR TOE 2 OR MORE VIEWS LEFT CLINICAL HISTORY: Unspecified injury of left foot, initial encounter TECHNIQUE: Three views of the left toes were performed COMPARISON: None. FINDINGS: Suspected acute nondisplaced fracture involving the 3rd middle phalanx.  There is soft tissue swelling about the 3rd toe.  No dislocation or destructive osseous process.  Baseline minimal DJD.  Lisfranc articulation is congruent.  No subcutaneous emphysema or radiopaque foreign body.     Third middle phalanx suspected acute nondisplaced fracture. Electronically signed by: Devin Dewitt MD Date:    06/11/2024 Time:    16:49    Assessment:     1. Closed nondisplaced fracture of middle phalanx of lesser toe of left foot, initial encounter    2. Injury of toe on left foot, initial encounter        Plan:       Closed nondisplaced fracture of middle phalanx of lesser toe of left foot, initial encounter  -     Corey tape (specify site)  -     Ambulatory referral/consult to Orthopedics    Injury of toe on left foot, initial encounter  -     X-Ray Toe 2 or More Views Left; Future; Expected date: 06/11/2024             Additional MDM:     Heart Failure Score:   COPD = No

## 2024-06-11 NOTE — PATIENT INSTRUCTIONS
Keep essence-tape on.  Please follow-up with orthopedics for further evaluation of your toe fracture.    What care is needed at home?   Ask your doctor what you need to do when you go home. Make sure you ask questions if you do not understand what the doctor says.  Wear your cast, walking boot, or special hard-soled shoe to support your foot as you were told to. After a few weeks, you may be able to take off the cast or boot and tape your toe to the one next to it for support. This is called essecne taping.  Prop your foot on pillows keeping it above the level of your heart. This may help lessen pain and swelling.  You can take pain medicines like acetaminophen, ibuprofen, or naproxen to help with pain.  Ice may help you ease pain and swelling.  Place an ice pack or a bag of frozen vegetables wrapped in a towel over the painful part. Never put ice right on the skin. Do not leave the ice on more than 10 to 15 minutes at a time. Use ice for the first 24 to 48 hours after an injury.  If you smoke, try to quit. Broken bones take longer to heal if you smoke.  What follow-up care is needed?   Your doctor may ask you to make visits to the office to check on your progress. Be sure to keep these visits.  You may need an x-ray to make sure that the bone is fully healed. If you have a cast, your doctor will remove it after your bone has healed.  You may need to see a doctor who specializes in bone fractures or foot problems.  You may also need to see a physical therapist (PT). The PT will help you with exercises if you have lost strength or range of motion in your toe or foot.  What drugs may be needed?   The doctor may order drugs to help with pain and swelling.  Will physical activity be limited?   You may need to rest your foot for a while. You may be limited in how much weight you are allowed to put through the foot with the injured toe. You should not do physical activity that makes your health problem worse. If you run, work  out, or play sports, you may not be able to do those things until your health problem gets better.  What problems could happen?   Long-term stiffness, pain, or arthritis in the toe  Bone does not heal properly  What can be done to prevent this health problem?   Wear well-fitting, supportive shoes when doing physical activities and sports.  Keep home and work areas clean of clutter and heavy objects that could cause you to stub your toe.  Do weight bearing exercises to build strong muscles and bones.  Eat food with a lot of calcium and vitamin D to keep your bones healthy. Include foods like low-fat milk, cheeses, and yogurt.  - Rest.    - Drink plenty of fluids.    - Acetaminophen (tylenol) or Ibuprofen (advil,motrin) as directed as needed for fever/pain. Avoid tylenol if you have a history of liver disease. Do not take ibuprofen if you have a history of GI bleeding, kidney disease, or if you take blood thinners.     - Follow up with your PCP or specialty clinic as directed in the next 1-2 weeks if not improved or as needed.  You can call [phone] to schedule an appointment with the appropriate provider.    - Go to the ER or seek medical attention immediately if you develop new or worsening symptoms.     - You must understand that you have received an Urgent Care treatment only and that you may be released before all of your medical problems are known or treated.   - You, the patient, will arrange for follow up care as instructed.   - If your condition worsens or fails to improve we recommend that you receive another evaluation at the ER immediately or contact your PCP to discuss your concerns or return here.

## 2024-09-25 ENCOUNTER — PATIENT MESSAGE (OUTPATIENT)
Dept: PSYCHIATRY | Facility: CLINIC | Age: 54
End: 2024-09-25
Payer: COMMERCIAL

## 2024-10-30 ENCOUNTER — OFFICE VISIT (OUTPATIENT)
Dept: PSYCHIATRY | Facility: CLINIC | Age: 54
End: 2024-10-30
Payer: COMMERCIAL

## 2024-10-30 DIAGNOSIS — F43.23 ADJUSTMENT DISORDER WITH MIXED ANXIETY AND DEPRESSED MOOD: Primary | ICD-10-CM

## 2024-10-30 PROCEDURE — 90834 PSYTX W PT 45 MINUTES: CPT | Mod: 95,,, | Performed by: PSYCHOLOGIST

## 2024-11-06 ENCOUNTER — OFFICE VISIT (OUTPATIENT)
Dept: PSYCHIATRY | Facility: CLINIC | Age: 54
End: 2024-11-06
Payer: COMMERCIAL

## 2024-11-06 DIAGNOSIS — F43.23 ADJUSTMENT DISORDER WITH MIXED ANXIETY AND DEPRESSED MOOD: Primary | ICD-10-CM

## 2024-11-06 PROCEDURE — 90834 PSYTX W PT 45 MINUTES: CPT | Mod: 95,,, | Performed by: PSYCHOLOGIST

## 2024-11-06 NOTE — PROGRESS NOTES
"Individual Psychotherapy (PhD/LCSW)    11/6/2024    Site:  [facility]         Therapeutic Intervention: Met with patient.  Outpatient - Insight oriented psychotherapy 45 min - CPT code 21226    Chief complaint/reason for encounter: depression and anxiety     Interval history and content of current session: Pt discussed feeling like a "failure" when her sons act in ways that don't accord with her values. We discussed her goals for her sons - to be able to love and to take responsibility for themselves as they grow into adulthood. Pt could see that all of her sons appear to be moving forward towards these goals. Also addressed the way she reacts to her spouse's criticism of her and her sons in the context of stepfx dynamics. Suggested and modeled a more assertive stance with him regarding his role as a stepfather.     Treatment plan:  Target symptoms: depression, anxiety , adjustment  Why chosen therapy is appropriate versus another modality: relevant to diagnosis, patient responds to this modality  Outcome monitoring methods: self-report, observation  Therapeutic intervention type: insight oriented psychotherapy    Risk parameters:  Patient reports no suicidal ideation  Patient reports no homicidal ideation  Patient reports no self-injurious behavior  Patient reports no violent behavior    Verbal deficits: None    Patient's response to intervention:  The patient's response to intervention is accepting.    Progress toward goals and other mental status changes:  The patient's progress toward goals is fair .    Diagnosis: Adjustment d/o with mixed anxiety and depressed mood.     Plan:  individual psychotherapy    Return to clinic: as scheduled    Length of Service (minutes): 45          "

## 2024-11-11 ENCOUNTER — PATIENT MESSAGE (OUTPATIENT)
Dept: PSYCHIATRY | Facility: CLINIC | Age: 54
End: 2024-11-11
Payer: COMMERCIAL

## 2024-11-13 ENCOUNTER — OFFICE VISIT (OUTPATIENT)
Dept: PSYCHIATRY | Facility: CLINIC | Age: 54
End: 2024-11-13
Payer: COMMERCIAL

## 2024-11-13 DIAGNOSIS — F43.23 ADJUSTMENT DISORDER WITH MIXED ANXIETY AND DEPRESSED MOOD: Primary | ICD-10-CM

## 2024-11-13 PROCEDURE — 90834 PSYTX W PT 45 MINUTES: CPT | Mod: 95,,, | Performed by: PSYCHOLOGIST

## 2024-11-13 NOTE — PROGRESS NOTES
Individual Psychotherapy (PhD/LCSW)    11/13/2024    Site:  [facility]         Therapeutic Intervention: Met with patient.  Outpatient - Insight oriented psychotherapy 45 min - CPT code 60477    Chief complaint/reason for encounter: depression and anxiety     Interval history and content of current session: Pt discussed being overwhelmed and pulled in different directions by the expectations of others in the fx to the point where she has lost herself. She focused mainly on her marital relationship with her spouse.  She perceives him as frustrated and critical of the degree to which she needs to be actively present in the daily lives of her children. She also finds herself resenting his expectations for her to conform to his priorities regarding household maintenance. We discussed ways that she could acknowledge and respond positively to his request for more quality time without sacrificing her primary objectives a mother at this time. She recognized that she could do more to carve out time for the marriage and time for herself while maintaining the casual unstructured time she wants to maintain with her children. Also discussed communication strategies to address her discomfort with the way she experiences his comments as controlling.       Treatment plan:  Target symptoms: depression, anxiety , adjustment  Why chosen therapy is appropriate versus another modality: relevant to diagnosis, patient responds to this modality  Outcome monitoring methods: self-report, observation  Therapeutic intervention type: insight oriented psychotherapy    Risk parameters:  Patient reports no suicidal ideation  Patient reports no homicidal ideation  Patient reports no self-injurious behavior  Patient reports no violent behavior    Verbal deficits: None    Patient's response to intervention:  The patient's response to intervention is accepting.    Progress toward goals and other mental status changes:  The patient's progress  toward goals is fair .    Diagnosis: Adjustment d/o with mixed anxiety and depressed mood.     Plan:  individual psychotherapy    Return to clinic: as scheduled    Length of Service (minutes): 45

## 2024-11-26 ENCOUNTER — OFFICE VISIT (OUTPATIENT)
Dept: PSYCHIATRY | Facility: CLINIC | Age: 54
End: 2024-11-26
Payer: COMMERCIAL

## 2024-11-26 DIAGNOSIS — F43.23 ADJUSTMENT DISORDER WITH MIXED ANXIETY AND DEPRESSED MOOD: Primary | ICD-10-CM

## 2024-11-26 PROCEDURE — 90834 PSYTX W PT 45 MINUTES: CPT | Mod: 95,,, | Performed by: PSYCHOLOGIST

## 2024-11-26 NOTE — PROGRESS NOTES
Individual Psychotherapy (PhD/LCSW)    11/26/2024    Site:  [facility]         Therapeutic Intervention: Met with patient.  Outpatient - Insight oriented psychotherapy 45 min - CPT code 00005    Chief complaint/reason for encounter: depression and anxiety     Interval history and content of current session: Pt reports that things are gong better with her spouse. She was able to let him know about the way certain incidental verbalizations were affecting her and he has refrained from continuing this behavior. .She discussed other ways her reluctance to define boundaries and let her needs be known builds up resentment and works against her sense of well-being. Illustrated how she could use writing as an alternative mode of communication to make it easier to convey her insights, concerns, and wished. Continued to model use of I-messages. Discussed the importance of maintaining boundaries and limits with her children. Also discussed and briefly practiced a mindfulness medication technique to mitigate anxiety and facilitate creativity in negotiating demands on her time.      Treatment plan:  Target symptoms: depression, anxiety , adjustment  Why chosen therapy is appropriate versus another modality: relevant to diagnosis, patient responds to this modality  Outcome monitoring methods: self-report, observation  Therapeutic intervention type: insight oriented psychotherapy    Risk parameters:  Patient reports no suicidal ideation  Patient reports no homicidal ideation  Patient reports no self-injurious behavior  Patient reports no violent behavior    Verbal deficits: None    Patient's response to intervention:  The patient's response to intervention is accepting.    Progress toward goals and other mental status changes:  The patient's progress toward goals is fair .    Diagnosis: Adjustment d/o with mixed anxiety and depressed mood.     Plan:  individual psychotherapy    Return to clinic: as scheduled    Length of  Service (minutes): 45

## 2024-12-19 ENCOUNTER — OFFICE VISIT (OUTPATIENT)
Dept: PSYCHIATRY | Facility: CLINIC | Age: 54
End: 2024-12-19
Payer: COMMERCIAL

## 2024-12-19 DIAGNOSIS — F43.23 ADJUSTMENT DISORDER WITH MIXED ANXIETY AND DEPRESSED MOOD: Primary | ICD-10-CM

## 2024-12-19 PROCEDURE — 90834 PSYTX W PT 45 MINUTES: CPT | Mod: 95,,, | Performed by: PSYCHOLOGIST

## 2024-12-19 NOTE — PROGRESS NOTES
"Individual Psychotherapy (PhD/LCSW)    12/19/2024    Site:  [facility]         Therapeutic Intervention: Met with patient.  Outpatient - Insight oriented psychotherapy 45 min - CPT code 32796    Chief complaint/reason for encounter: depression and anxiety     Interval history and content of current session: Pt continues to feel overwhelmed by demands and expectations of others for her time and resources (her adolescent children, her sister, her ex-spouse; etc). . Pt admits that she has a hard time saying "no." Pt acknowledges that her ADD makes it hard to stay focused and manage her time effectively despite medication and other efforts to stay on track. She often feels at the end of the day she has not accomplished anything. She recognizes she needs to get better at defining and consistently maintaining her boundaries. We discussed changing her primary goal from getting things done to doing what she can in a calm way. She resonated to this way of framing her goal. Also suggested she be more open with those in her life about her own stress and needs especially with her sister and her oldest son. She agreed this could be helpful and intends to f/u on it.      Treatment plan:  Target symptoms: depression, anxiety , adjustment  Why chosen therapy is appropriate versus another modality: relevant to diagnosis, patient responds to this modality  Outcome monitoring methods: self-report, observation  Therapeutic intervention type: insight oriented psychotherapy    Risk parameters:  Patient reports no suicidal ideation  Patient reports no homicidal ideation  Patient reports no self-injurious behavior  Patient reports no violent behavior    Verbal deficits: None    Patient's response to intervention:  The patient's response to intervention is accepting.    Progress toward goals and other mental status changes:  The patient's progress toward goals is fair .    Diagnosis: Adjustment d/o with mixed anxiety and depressed " mood.     Plan:  individual psychotherapy    Return to clinic: as scheduled    Length of Service (minutes): 45

## 2025-01-02 ENCOUNTER — OFFICE VISIT (OUTPATIENT)
Dept: PSYCHIATRY | Facility: CLINIC | Age: 55
End: 2025-01-02
Payer: COMMERCIAL

## 2025-01-02 DIAGNOSIS — F32.A DEPRESSION, UNSPECIFIED DEPRESSION TYPE: ICD-10-CM

## 2025-01-02 DIAGNOSIS — F41.1 GAD (GENERALIZED ANXIETY DISORDER): Primary | ICD-10-CM

## 2025-01-02 NOTE — PROGRESS NOTES
Individual Psychotherapy (PhD/LCSW)    1/2/2025    Site:  [facility]         Therapeutic Intervention: Met with patient.  Outpatient - Insight oriented psychotherapy 45 min - CPT code 76256    Chief complaint/reason for encounter: depression and anxiety     Interval history and content of current session: Pt discussed her continued efforts to set and maintain boundaries with her children in an effort to feel less overwhelmed by their expectations. She notes that she loses her temper at times when she is being asked to do more for them when she is doing so much. She then feels excessive guilt for acting out rather than remaining calm, rational, and understanding. Discussion of her experiences growing up relatively disadvantaged to her school peers suggested a long standing tendency to pressure herself with excessive expectations for performance. Worked with her to develop strategies for communicating her limits to her children (and spouse) that could relieve the pressure to meet unreasonable expectations without excessive guilt on her part.     Treatment plan:  Target symptoms: depression, anxiety , adjustment  Why chosen therapy is appropriate versus another modality: relevant to diagnosis, patient responds to this modality  Outcome monitoring methods: self-report, observation  Therapeutic intervention type: insight oriented psychotherapy    Risk parameters:  Patient reports no suicidal ideation  Patient reports no homicidal ideation  Patient reports no self-injurious behavior  Patient reports no violent behavior    Verbal deficits: None    Patient's response to intervention:  The patient's response to intervention is accepting.    Progress toward goals and other mental status changes:  The patient's progress toward goals is fair .    Diagnosis:  ANGÉLICA; Depression (unspecified)     Plan:  individual psychotherapy    Return to clinic: as scheduled    Length of Service (minutes): 45

## 2025-01-17 ENCOUNTER — OFFICE VISIT (OUTPATIENT)
Dept: PSYCHIATRY | Facility: CLINIC | Age: 55
End: 2025-01-17
Payer: COMMERCIAL

## 2025-01-17 DIAGNOSIS — F32.A DEPRESSION, UNSPECIFIED DEPRESSION TYPE: ICD-10-CM

## 2025-01-17 DIAGNOSIS — F41.1 GAD (GENERALIZED ANXIETY DISORDER): Primary | ICD-10-CM

## 2025-01-17 PROCEDURE — 90834 PSYTX W PT 45 MINUTES: CPT | Mod: 95,,, | Performed by: PSYCHOLOGIST

## 2025-01-17 NOTE — PROGRESS NOTES
Individual Psychotherapy (PhD/LCSW)    1/17/2025    Site:  [facility]         Therapeutic Intervention: Met with patient.  Outpatient - Insight oriented psychotherapy 45 min - CPT code 41661    Chief complaint/reason for encounter: depression and anxiety     Interval history and content of current session: Pt discussed her distress about being caught in middle between her children (especially her adult son Carlos) and her spouse Raoul. Helped her recognize the way she is perpetuating the conflict in her efforts to get them to resolve their differences by refereeing and mediating between the two of them. Suggested she consider defining her limits, expectations, and boundaries for the household and let them each decide if they want to accept them and stay in the household.      Treatment plan:  Target symptoms: depression, anxiety , adjustment  Why chosen therapy is appropriate versus another modality: relevant to diagnosis, patient responds to this modality  Outcome monitoring methods: self-report, observation  Therapeutic intervention type: insight oriented psychotherapy    Risk parameters:  Patient reports no suicidal ideation  Patient reports no homicidal ideation  Patient reports no self-injurious behavior  Patient reports no violent behavior    Verbal deficits: None    Patient's response to intervention:  The patient's response to intervention is accepting.    Progress toward goals and other mental status changes:  The patient's progress toward goals is fair .    Diagnosis:  ANGÉLICA; Depression (unspecified)     Plan:  individual psychotherapy    Return to clinic: as scheduled    Length of Service (minutes): 45

## 2025-01-29 ENCOUNTER — OFFICE VISIT (OUTPATIENT)
Dept: PSYCHIATRY | Facility: CLINIC | Age: 55
End: 2025-01-29
Payer: COMMERCIAL

## 2025-01-29 DIAGNOSIS — F41.1 GAD (GENERALIZED ANXIETY DISORDER): Primary | ICD-10-CM

## 2025-01-29 DIAGNOSIS — F32.A DEPRESSION, UNSPECIFIED DEPRESSION TYPE: ICD-10-CM

## 2025-01-29 PROCEDURE — 90834 PSYTX W PT 45 MINUTES: CPT | Mod: 95,,, | Performed by: PSYCHOLOGIST

## 2025-01-29 NOTE — PROGRESS NOTES
Individual Psychotherapy (PhD/LCSW)    1/29/2025    Site:  [facility]         Therapeutic Intervention: Met with patient.  Outpatient - Insight oriented psychotherapy 45 min - CPT code 83051    Chief complaint/reason for encounter: depression and anxiety     Interval history and content of current session: Pt continues struggle with anxiety in connection with her fx relationships. The focus has shifted from her spouse and her sons to her ex-spouse and the sons. Pt is now having to contend with the influence of her ex-spouse on her oldest son Sinan who appears to have aligned himself with the ex. Discussed with pt strategies for depersonalizing the criticism and manipulation she perceives coming from the ex and Sinan. Encouraged pt to remain clear regarding her boundaries and her values as a parent. Noted that she has been successful overall in her parenting as evidenced by the way her sons are doing despite the stress of divorce and stepfx dynamics.     Treatment plan:  Target symptoms: depression, anxiety , adjustment  Why chosen therapy is appropriate versus another modality: relevant to diagnosis, patient responds to this modality  Outcome monitoring methods: self-report, observation  Therapeutic intervention type: insight oriented psychotherapy    Risk parameters:  Patient reports no suicidal ideation  Patient reports no homicidal ideation  Patient reports no self-injurious behavior  Patient reports no violent behavior    Verbal deficits: None    Patient's response to intervention:  The patient's response to intervention is accepting.    Progress toward goals and other mental status changes:  The patient's progress toward goals is fair .    Diagnosis:  ANGÉLICA; Depression (unspecified)     Plan:  individual psychotherapy    Return to clinic: as scheduled    Length of Service (minutes): 45

## 2025-02-05 ENCOUNTER — OFFICE VISIT (OUTPATIENT)
Dept: PSYCHIATRY | Facility: CLINIC | Age: 55
End: 2025-02-05
Payer: COMMERCIAL

## 2025-02-05 DIAGNOSIS — F32.A DEPRESSION, UNSPECIFIED DEPRESSION TYPE: ICD-10-CM

## 2025-02-05 DIAGNOSIS — F41.1 GAD (GENERALIZED ANXIETY DISORDER): Primary | ICD-10-CM

## 2025-02-05 PROCEDURE — 90834 PSYTX W PT 45 MINUTES: CPT | Mod: 95,,, | Performed by: PSYCHOLOGIST

## 2025-02-05 NOTE — PROGRESS NOTES
Individual Psychotherapy (PhD/LCSW)    2/5/2025    Site:  [facility]         Therapeutic Intervention: Met with patient.  Outpatient - Insight oriented psychotherapy 45 min - CPT code 38179    Chief complaint/reason for encounter: depression and anxiety     Interval history and content of current session: Pt describes herself as anxious about everything these days: her children, her marriage, her work, and her mental state. Noted with her the way her anxiety is related to her expectations of herself to solve other people's problems and excel at work. She agreed that her success in life has been based on this goal oriented drive and that it makes her hard to relax and to function well when things are in disarray as they are now with her home renovation. 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 Encouraged her to employ a daily ritual of awareness (eg mindfulness meditation) to help her recognize when her expectations of herself are excessively high and her appreciation of herself is too low.     Treatment plan:  Target symptoms: depression, anxiety , adjustment  Why chosen therapy is appropriate versus another modality: relevant to diagnosis, patient responds to this modality  Outcome monitoring methods: self-report, observation  Therapeutic intervention type: insight oriented psychotherapy    Risk parameters:  Patient reports no suicidal ideation  Patient reports no homicidal ideation  Patient reports no self-injurious behavior  Patient reports no violent behavior    Verbal deficits: None    Patient's response to intervention:  The patient's response to intervention is accepting.    Progress toward goals and other mental status changes:  The patient's progress toward goals is fair .    Diagnosis:  ANGÉLICA; Depression (unspecified)     Plan:  individual psychotherapy    Return to clinic: as scheduled    Length of Service  (minutes): 45

## 2025-03-05 ENCOUNTER — OFFICE VISIT (OUTPATIENT)
Dept: PSYCHIATRY | Facility: CLINIC | Age: 55
End: 2025-03-05
Payer: COMMERCIAL

## 2025-03-05 DIAGNOSIS — F32.A DEPRESSION, UNSPECIFIED DEPRESSION TYPE: ICD-10-CM

## 2025-03-05 DIAGNOSIS — F41.1 GAD (GENERALIZED ANXIETY DISORDER): Primary | ICD-10-CM

## 2025-03-05 PROCEDURE — 90834 PSYTX W PT 45 MINUTES: CPT | Mod: 95,,, | Performed by: PSYCHOLOGIST

## 2025-03-05 NOTE — PROGRESS NOTES
The patient location is: [facility]  The chief complaint leading to consultation is: anxiety    Visit type: audiovisual      Face to Face time with patient: 45 minutes   50 minutes of total time spent on the encounter, which includes face to face time and non-face to face time preparing to see the patient (eg, review of tests), Obtaining and/or reviewing separately obtained history, Documenting clinical information in the electronic or other health record, Independently interpreting results (not separately reported) and communicating results to the patient/family/caregiver, or Care coordination (not separately reported).         Each patient to whom he or she provides medical services by telemedicine is:  (1) informed of the relationship between the physician and patient and the respective role of any other health care provider with respect to management of the patient; and (2) notified that he or she may decline to receive medical services by telemedicine and may withdraw from such care at any time.    Notes:      Individual Psychotherapy (PhD/LCSW)    3/5/2025    Site:  [facility]         Therapeutic Intervention: Met with patient.  Outpatient - Insight oriented psychotherapy 45 min - CPT code 08695    Chief complaint/reason for encounter: depression and anxiety     Interval history and content of current session: Pt focused on problems with both her spouse and her ex-spouse. In both cases noted her tendency to appease the other and to rely on logical argument to defend herself from criticism.  Modeled for her how to respond assertively, set limits, maintain her boundaries, while behaving in accord with her values. Also discussed how to frame the problem with her spouse in a way that encourages collaboration for the sake of the relationship as a whole rather than determine who is right and who is wrong.       Treatment plan:  Target symptoms: depression, anxiety , adjustment  Why chosen therapy is  appropriate versus another modality: relevant to diagnosis, patient responds to this modality  Outcome monitoring methods: self-report, observation  Therapeutic intervention type: insight oriented psychotherapy    Risk parameters:  Patient reports no suicidal ideation  Patient reports no homicidal ideation  Patient reports no self-injurious behavior  Patient reports no violent behavior    Verbal deficits: None    Patient's response to intervention:  The patient's response to intervention is accepting.    Progress toward goals and other mental status changes:  The patient's progress toward goals is fair .    Diagnosis:  ANGÉLICA; Depression (unspecified)     Plan:  individual psychotherapy    Return to clinic: as scheduled    Length of Service (minutes): 45

## 2025-04-03 ENCOUNTER — OFFICE VISIT (OUTPATIENT)
Dept: PSYCHIATRY | Facility: CLINIC | Age: 55
End: 2025-04-03
Payer: COMMERCIAL

## 2025-04-03 DIAGNOSIS — F32.A DEPRESSION, UNSPECIFIED DEPRESSION TYPE: ICD-10-CM

## 2025-04-03 DIAGNOSIS — F41.1 GAD (GENERALIZED ANXIETY DISORDER): Primary | ICD-10-CM

## 2025-04-03 PROCEDURE — 90834 PSYTX W PT 45 MINUTES: CPT | Mod: 95,,, | Performed by: PSYCHOLOGIST

## 2025-04-03 NOTE — PROGRESS NOTES
The patient location is: [facility]  The chief complaint leading to consultation is: anxiety    Visit type: audiovisual      Face to Face time with patient: 45 minutes   50 minutes of total time spent on the encounter, which includes face to face time and non-face to face time preparing to see the patient (eg, review of tests), Obtaining and/or reviewing separately obtained history, Documenting clinical information in the electronic or other health record, Independently interpreting results (not separately reported) and communicating results to the patient/family/caregiver, or Care coordination (not separately reported).         Each patient to whom he or she provides medical services by telemedicine is:  (1) informed of the relationship between the physician and patient and the respective role of any other health care provider with respect to management of the patient; and (2) notified that he or she may decline to receive medical services by telemedicine and may withdraw from such care at any time.    Notes:      Individual Psychotherapy (PhD/LCSW)    4/3/2025    Site:  [facility]         Therapeutic Intervention: Met with patient.  Outpatient - Insight oriented psychotherapy 45 min - CPT code 53080    Chief complaint/reason for encounter: depression and anxiety     Interval history and content of current session: Pt focused on ongoing problems with spouse regarding finances and their differing priorities. Pt is clear that her priority is her 3 boys. She perceives her spouse's priority as the house. Recently spouse got into significant consumer debt. Pt had to invade her 401K to bail him out. She is now very anxious about his spending behavior and their conflict about same escalated into a major verbal fight. Discussed with pt ways of approaching the situation which could alleviate her anxiety by clarifying the issues and differences in priority and considering a legal separation of community.  Encouraged pt to discuss their differences in priority, differing attitudes about finances and seek a solution that will be acceptable to both.       Treatment plan:  Target symptoms: depression, anxiety , adjustment  Why chosen therapy is appropriate versus another modality: relevant to diagnosis, patient responds to this modality  Outcome monitoring methods: self-report, observation  Therapeutic intervention type: insight oriented psychotherapy    Risk parameters:  Patient reports no suicidal ideation  Patient reports no homicidal ideation  Patient reports no self-injurious behavior  Patient reports no violent behavior    Verbal deficits: None    Patient's response to intervention:  The patient's response to intervention is accepting.    Progress toward goals and other mental status changes:  The patient's progress toward goals is fair .    Diagnosis:  ANGÉLICA; Depression (unspecified)     Plan:  individual psychotherapy    Return to clinic: as scheduled    Length of Service (minutes): 45

## 2025-04-28 ENCOUNTER — OFFICE VISIT (OUTPATIENT)
Dept: PSYCHIATRY | Facility: CLINIC | Age: 55
End: 2025-04-28
Payer: COMMERCIAL

## 2025-04-28 DIAGNOSIS — F32.A DEPRESSION, UNSPECIFIED DEPRESSION TYPE: ICD-10-CM

## 2025-04-28 DIAGNOSIS — F41.1 GAD (GENERALIZED ANXIETY DISORDER): Primary | ICD-10-CM

## 2025-04-28 PROCEDURE — 90834 PSYTX W PT 45 MINUTES: CPT | Mod: 95,,, | Performed by: PSYCHOLOGIST

## 2025-04-28 NOTE — PROGRESS NOTES
The patient location is: [facility]  The chief complaint leading to consultation is: anxiety    Visit type: audiovisual      Face to Face time with patient: 45 minutes   50 minutes of total time spent on the encounter, which includes face to face time and non-face to face time preparing to see the patient (eg, review of tests), Obtaining and/or reviewing separately obtained history, Documenting clinical information in the electronic or other health record, Independently interpreting results (not separately reported) and communicating results to the patient/family/caregiver, or Care coordination (not separately reported).         Each patient to whom he or she provides medical services by telemedicine is:  (1) informed of the relationship between the physician and patient and the respective role of any other health care provider with respect to management of the patient; and (2) notified that he or she may decline to receive medical services by telemedicine and may withdraw from such care at any time.    Notes:      Individual Psychotherapy (PhD/LCSW)    4/28/2025    Site:  [facility]         Therapeutic Intervention: Met with patient.  Outpatient - Insight oriented psychotherapy 45 min - CPT code 58529    Chief complaint/reason for encounter: depression and anxiety     Interval history and content of current session: Pt continues to feel caught in the middle between the expectations of her spouse and the needs of her teenage children. She feels pressured by him to set up and adhere to strict guidelines for their behavior and agree to a timeline regarding when they should be independent and move out of the household. Pt recognizes that her spouse is unhappy (ie depressed) by her unwillingness  to go along with his expectations and their differences is threatening the stability of their marriage. She agreed that the welfare of her children is her highest priority and that it is not subject to negotiation.  She is willing to listen to his opinions but must decide for herself what is in their best interests. We discussed how she can communicate this to him assertively without dismissing his feelings. Also discussed how she can negotiate with him and commit herself to spending more quality time with her spouse in ways that do not undermine the priority of their needs (especially with regard to her youngest child who is still in highschool).       Treatment plan:   Target symptoms: depression, anxiety , adjustment  Why chosen therapy is appropriate versus another modality: relevant to diagnosis, patient responds to this modality  Outcome monitoring methods: self-report, observation  Therapeutic intervention type: insight oriented psychotherapy    Risk parameters:  Patient reports no suicidal ideation  Patient reports no homicidal ideation  Patient reports no self-injurious behavior  Patient reports no violent behavior    Verbal deficits: None    Patient's response to intervention:  The patient's response to intervention is accepting.    Progress toward goals and other mental status changes:  The patient's progress toward goals is fair .    Diagnosis:  ANGÉLICA; Depression (unspecified)     Plan:  individual psychotherapy    Return to clinic: as scheduled    Length of Service (minutes): 45

## 2025-05-15 ENCOUNTER — OFFICE VISIT (OUTPATIENT)
Dept: PSYCHIATRY | Facility: CLINIC | Age: 55
End: 2025-05-15
Payer: COMMERCIAL

## 2025-05-15 DIAGNOSIS — F41.1 GAD (GENERALIZED ANXIETY DISORDER): Primary | ICD-10-CM

## 2025-05-15 DIAGNOSIS — F32.A DEPRESSION, UNSPECIFIED DEPRESSION TYPE: ICD-10-CM

## 2025-05-15 PROCEDURE — 90834 PSYTX W PT 45 MINUTES: CPT | Mod: 95,,, | Performed by: PSYCHOLOGIST

## 2025-05-19 NOTE — PROGRESS NOTES
The patient location is: [facility]  The chief complaint leading to consultation is: anxiety    Visit type: audiovisual      Face to Face time with patient: 45 minutes   50 minutes of total time spent on the encounter, which includes face to face time and non-face to face time preparing to see the patient (eg, review of tests), Obtaining and/or reviewing separately obtained history, Documenting clinical information in the electronic or other health record, Independently interpreting results (not separately reported) and communicating results to the patient/family/caregiver, or Care coordination (not separately reported).         Each patient to whom he or she provides medical services by telemedicine is:  (1) informed of the relationship between the physician and patient and the respective role of any other health care provider with respect to management of the patient; and (2) notified that he or she may decline to receive medical services by telemedicine and may withdraw from such care at any time.    Notes:      Individual Psychotherapy (PhD/LCSW)    5/15/2025    Site:  [facility]         Therapeutic Intervention: Met with patient.  Outpatient - Insight oriented psychotherapy 45 min - CPT code 92337    Chief complaint/reason for encounter: depression and anxiety     Interval history and content of current session: Pt continues to be very distressed about her relationship with spouse regarding their conflicting needs and expectations vis-a-vis parenting. Noted with patient that their difficulties are common in stepfamilies and that the issues of priority, authority and responsibility have never been fully addressed and resolved. Pt acknowledged the fear that such a discussion could lead to divorce so she tends to rely on attempts to placate spouse or change his opinions, neither of which has been successful. Since this conflict appears to be worsening suggested she take a more assertive approach in  clarifying her needs and the primacy of her authority as their parent. Noted with her alternative ways of strengthening their relationship besides one of them giving in to the other.         Treatment plan:   Target symptoms: depression, anxiety , adjustment  Why chosen therapy is appropriate versus another modality: relevant to diagnosis, patient responds to this modality  Outcome monitoring methods: self-report, observation  Therapeutic intervention type: insight oriented psychotherapy    Risk parameters:  Patient reports no suicidal ideation  Patient reports no homicidal ideation  Patient reports no self-injurious behavior  Patient reports no violent behavior    Verbal deficits: None    Patient's response to intervention:  The patient's response to intervention is accepting.    Progress toward goals and other mental status changes:  The patient's progress toward goals is fair .    Diagnosis:  ANGÉLICA; Depression (unspecified)     Plan:  individual psychotherapy    Return to clinic: as scheduled    Length of Service (minutes): 45

## 2025-05-21 ENCOUNTER — OFFICE VISIT (OUTPATIENT)
Dept: PSYCHIATRY | Facility: CLINIC | Age: 55
End: 2025-05-21
Payer: COMMERCIAL

## 2025-05-21 DIAGNOSIS — F41.1 GAD (GENERALIZED ANXIETY DISORDER): Primary | ICD-10-CM

## 2025-05-21 DIAGNOSIS — F32.A DEPRESSION, UNSPECIFIED DEPRESSION TYPE: ICD-10-CM

## 2025-05-21 PROCEDURE — 90834 PSYTX W PT 45 MINUTES: CPT | Mod: 95,,, | Performed by: PSYCHOLOGIST

## 2025-05-21 NOTE — PROGRESS NOTES
"The patient location is: [facility]  The chief complaint leading to consultation is: anxiety    Visit type: audiovisual      Face to Face time with patient: 45 minutes   50 minutes of total time spent on the encounter, which includes face to face time and non-face to face time preparing to see the patient (eg, review of tests), Obtaining and/or reviewing separately obtained history, Documenting clinical information in the electronic or other health record, Independently interpreting results (not separately reported) and communicating results to the patient/family/caregiver, or Care coordination (not separately reported).         Each patient to whom he or she provides medical services by telemedicine is:  (1) informed of the relationship between the physician and patient and the respective role of any other health care provider with respect to management of the patient; and (2) notified that he or she may decline to receive medical services by telemedicine and may withdraw from such care at any time.    Notes:      Individual Psychotherapy (PhD/LCSW)    5/21/2025    Site:  [facility]         Therapeutic Intervention: Met with patient.  Outpatient - Insight oriented psychotherapy 45 min - CPT code 38583    Chief complaint/reason for encounter: depression and anxiety     Interval history and content of current session: Pt explored her ongoing tension related to stepfx dynamics and her conflicting needs to do what is right for her teenage/young adult sons and the needs of her spouse. Specifically, pt feels pressured by spouse to give her sons a "timeline" spelling out when they need to be out of the house and take full responsibility for themselves. Pt was able to recognize and accept that this strategy is unacceptable to her and that their parent she has to trust her judgement ("her gut") as to when its appropriate to expect them to assume such responsibility. Pt fearful of articulating this to her spouse " but realizes she will have to do so when he brings up the subject again. In the meantime she agrees she needs to think about how to increase the quality time she spends with her spouse without the children present for the sake of the marital relationship.       Treatment plan:   Target symptoms: depression, anxiety , adjustment  Why chosen therapy is appropriate versus another modality: relevant to diagnosis, patient responds to this modality  Outcome monitoring methods: self-report, observation  Therapeutic intervention type: insight oriented psychotherapy    Risk parameters:  Patient reports no suicidal ideation  Patient reports no homicidal ideation  Patient reports no self-injurious behavior  Patient reports no violent behavior    Verbal deficits: None    Patient's response to intervention:  The patient's response to intervention is accepting.    Progress toward goals and other mental status changes:  The patient's progress toward goals is fair .    Diagnosis:  ANGÉLICA; Depression (unspecified)     Plan:  individual psychotherapy    Return to clinic: as scheduled    Length of Service (minutes): 45

## 2025-06-10 ENCOUNTER — OFFICE VISIT (OUTPATIENT)
Dept: PSYCHIATRY | Facility: CLINIC | Age: 55
End: 2025-06-10
Payer: COMMERCIAL

## 2025-06-10 DIAGNOSIS — F32.A DEPRESSION, UNSPECIFIED DEPRESSION TYPE: ICD-10-CM

## 2025-06-10 DIAGNOSIS — F41.1 GAD (GENERALIZED ANXIETY DISORDER): Primary | ICD-10-CM

## 2025-06-10 PROCEDURE — 90834 PSYTX W PT 45 MINUTES: CPT | Mod: 95,,, | Performed by: PSYCHOLOGIST

## 2025-06-10 NOTE — PROGRESS NOTES
The patient location is: [facility]  The chief complaint leading to consultation is: anxiety    Visit type: audiovisual      Face to Face time with patient: 45 minutes   50 minutes of total time spent on the encounter, which includes face to face time and non-face to face time preparing to see the patient (eg, review of tests), Obtaining and/or reviewing separately obtained history, Documenting clinical information in the electronic or other health record, Independently interpreting results (not separately reported) and communicating results to the patient/family/caregiver, or Care coordination (not separately reported).         Each patient to whom he or she provides medical services by telemedicine is:  (1) informed of the relationship between the physician and patient and the respective role of any other health care provider with respect to management of the patient; and (2) notified that he or she may decline to receive medical services by telemedicine and may withdraw from such care at any time.    Notes:      Individual Psychotherapy (PhD/LCSW)    6/10/2025    Site:  [facility]         Therapeutic Intervention: Met with patient.  Outpatient - Insight oriented psychotherapy 45 min - CPT code 26739    Chief complaint/reason for encounter: depression and anxiety     Interval history and content of current session: Pt noted several positive development since our last session. She and spouse had a good weekend together away from her sons and they handled things at the house appropriately. She noted that he is getting along better with her sons and he has backed off from criticism from her parenting. She is pleased with the way her oldest son has responded to a possible job offer by pt's cousin in her law firm and pt is letting her son handle it himself. Pt discussed possible boundary issues developing in connecting with her father's offer of construction work to her middle son. We discussed strategies  for staying out the middle of potential issues between her son and her father.  Also addressed issues that have arisen with her ex-spouse regarding her youngest son's summer plans. Noted the way she was able to be assertive with her youngest son about his responsibility if he wants to follow up on his father's offer of flying lessons and avoid a power struggle with her ex-spouse.         Treatment plan:   Target symptoms: depression, anxiety , adjustment  Why chosen therapy is appropriate versus another modality: relevant to diagnosis, patient responds to this modality  Outcome monitoring methods: self-report, observation  Therapeutic intervention type: insight oriented psychotherapy    Risk parameters:  Patient reports no suicidal ideation  Patient reports no homicidal ideation  Patient reports no self-injurious behavior  Patient reports no violent behavior    Verbal deficits: None    Patient's response to intervention:  The patient's response to intervention is accepting.    Progress toward goals and other mental status changes:  The patient's progress toward goals is fair .    Diagnosis:  ANGÉLICA; Depression (unspecified)     Plan:  individual psychotherapy    Return to clinic: as scheduled    Length of Service (minutes): 45

## 2025-07-17 ENCOUNTER — OFFICE VISIT (OUTPATIENT)
Dept: PSYCHIATRY | Facility: CLINIC | Age: 55
End: 2025-07-17
Payer: COMMERCIAL

## 2025-07-17 DIAGNOSIS — F32.A DEPRESSION, UNSPECIFIED DEPRESSION TYPE: ICD-10-CM

## 2025-07-17 DIAGNOSIS — F41.1 GAD (GENERALIZED ANXIETY DISORDER): Primary | ICD-10-CM

## 2025-07-17 PROCEDURE — 90834 PSYTX W PT 45 MINUTES: CPT | Mod: 95,,, | Performed by: PSYCHOLOGIST

## 2025-07-17 NOTE — PROGRESS NOTES
The patient location is: [facility]  The chief complaint leading to consultation is: anxiety    Visit type: audiovisual      Face to Face time with patient: 45 minutes   50 minutes of total time spent on the encounter, which includes face to face time and non-face to face time preparing to see the patient (eg, review of tests), Obtaining and/or reviewing separately obtained history, Documenting clinical information in the electronic or other health record, Independently interpreting results (not separately reported) and communicating results to the patient/family/caregiver, or Care coordination (not separately reported).         Each patient to whom he or she provides medical services by telemedicine is:  (1) informed of the relationship between the physician and patient and the respective role of any other health care provider with respect to management of the patient; and (2) notified that he or she may decline to receive medical services by telemedicine and may withdraw from such care at any time.    Notes:      Individual Psychotherapy (PhD/LCSW)    7/17/2025    Site:  [facility]         Therapeutic Intervention: Met with patient.  Outpatient - Insight oriented psychotherapy 45 min - CPT code 38644    Chief complaint/reason for encounter: depression and anxiety     Interval history and content of current session: Pt maintaining improved mood and outlook. She reports progress in her marital relationship: improved communication; more quality time; more fx interaction between spouse and her sons. Pt has learned that in arguments between she and her spouse they are both saying the same thing (differently) and she doesn't have to continue to fight until she wins the argument, Pt notes that her ex-spouse continues to say things that undermine her sense of competence and competence as a parent; but she can consider the source and detoxify the communication (and if necessary temporarily block her ex from  texting her). Pt's sons continue to move forward in their lives. She's happy that her oldest is going to law school and has found a job in a law firm. Her middle son is starting college. And her youngest is working and doing well in high school. Pt feels a need add more routine in her life to cut down on procrastination and wasted time. She is going to experiment with leaving the house earlier in the morning and going to the gym after she finishes work in the afternoon.         Treatment plan:   Target symptoms: depression, anxiety , adjustment  Why chosen therapy is appropriate versus another modality: relevant to diagnosis, patient responds to this modality  Outcome monitoring methods: self-report, observation  Therapeutic intervention type: insight oriented psychotherapy    Risk parameters:  Patient reports no suicidal ideation  Patient reports no homicidal ideation  Patient reports no self-injurious behavior  Patient reports no violent behavior    Verbal deficits: None    Patient's response to intervention:  The patient's response to intervention is accepting.    Progress toward goals and other mental status changes:  The patient's progress toward goals is fair .    Diagnosis:  ANGÉLICA; Depression (unspecified)     Plan:  individual psychotherapy    Return to clinic: as scheduled    Length of Service (minutes): 45

## 2025-08-12 ENCOUNTER — OFFICE VISIT (OUTPATIENT)
Dept: PSYCHIATRY | Facility: CLINIC | Age: 55
End: 2025-08-12
Payer: COMMERCIAL

## 2025-08-12 DIAGNOSIS — F41.1 GAD (GENERALIZED ANXIETY DISORDER): Primary | ICD-10-CM

## 2025-08-12 DIAGNOSIS — F32.A DEPRESSION, UNSPECIFIED DEPRESSION TYPE: ICD-10-CM

## 2025-08-12 PROCEDURE — 90834 PSYTX W PT 45 MINUTES: CPT | Mod: 95,,, | Performed by: PSYCHOLOGIST
